# Patient Record
Sex: MALE | Race: OTHER | NOT HISPANIC OR LATINO
[De-identification: names, ages, dates, MRNs, and addresses within clinical notes are randomized per-mention and may not be internally consistent; named-entity substitution may affect disease eponyms.]

---

## 2017-10-30 PROBLEM — Z00.00 ENCOUNTER FOR PREVENTIVE HEALTH EXAMINATION: Status: ACTIVE | Noted: 2017-10-30

## 2017-11-08 ENCOUNTER — APPOINTMENT (OUTPATIENT)
Dept: SURGICAL ONCOLOGY | Facility: CLINIC | Age: 57
End: 2017-11-08
Payer: MEDICAID

## 2017-11-08 VITALS
HEART RATE: 48 BPM | HEIGHT: 71 IN | SYSTOLIC BLOOD PRESSURE: 162 MMHG | TEMPERATURE: 98.1 F | WEIGHT: 220 LBS | BODY MASS INDEX: 30.8 KG/M2 | DIASTOLIC BLOOD PRESSURE: 94 MMHG

## 2017-11-08 DIAGNOSIS — Z80.42 FAMILY HISTORY OF MALIGNANT NEOPLASM OF PROSTATE: ICD-10-CM

## 2017-11-08 DIAGNOSIS — R93.8 ABNORMAL FINDINGS ON DIAGNOSTIC IMAGING OF OTHER SPECIFIED BODY STRUCTURES: ICD-10-CM

## 2017-11-08 DIAGNOSIS — Z87.891 PERSONAL HISTORY OF NICOTINE DEPENDENCE: ICD-10-CM

## 2017-11-08 DIAGNOSIS — Z80.1 FAMILY HISTORY OF MALIGNANT NEOPLASM OF TRACHEA, BRONCHUS AND LUNG: ICD-10-CM

## 2017-11-08 PROCEDURE — 99204 OFFICE O/P NEW MOD 45 MIN: CPT

## 2017-11-12 ENCOUNTER — FORM ENCOUNTER (OUTPATIENT)
Age: 57
End: 2017-11-12

## 2017-11-13 ENCOUNTER — APPOINTMENT (OUTPATIENT)
Dept: RADIOLOGY | Facility: CLINIC | Age: 57
End: 2017-11-13
Payer: MEDICAID

## 2017-11-13 ENCOUNTER — OUTPATIENT (OUTPATIENT)
Dept: OUTPATIENT SERVICES | Facility: HOSPITAL | Age: 57
LOS: 1 days | End: 2017-11-13
Payer: MEDICAID

## 2017-11-13 ENCOUNTER — APPOINTMENT (OUTPATIENT)
Dept: ULTRASOUND IMAGING | Facility: CLINIC | Age: 57
End: 2017-11-13
Payer: MEDICAID

## 2017-11-13 DIAGNOSIS — Z00.8 ENCOUNTER FOR OTHER GENERAL EXAMINATION: ICD-10-CM

## 2017-11-13 DIAGNOSIS — S62.90XA UNSPECIFIED FRACTURE OF UNSPECIFIED WRIST AND HAND, INITIAL ENCOUNTER FOR CLOSED FRACTURE: Chronic | ICD-10-CM

## 2017-11-13 DIAGNOSIS — R22.41 LOCALIZED SWELLING, MASS AND LUMP, RIGHT LOWER LIMB: ICD-10-CM

## 2017-11-13 PROCEDURE — 73552 X-RAY EXAM OF FEMUR 2/>: CPT | Mod: 26,RT

## 2017-11-13 PROCEDURE — 76882 US LMTD JT/FCL EVL NVASC XTR: CPT

## 2017-11-13 PROCEDURE — 76882 US LMTD JT/FCL EVL NVASC XTR: CPT | Mod: 26,RT

## 2017-11-13 PROCEDURE — 73552 X-RAY EXAM OF FEMUR 2/>: CPT

## 2017-11-19 ENCOUNTER — FORM ENCOUNTER (OUTPATIENT)
Age: 57
End: 2017-11-19

## 2017-11-20 ENCOUNTER — APPOINTMENT (OUTPATIENT)
Dept: ULTRASOUND IMAGING | Facility: CLINIC | Age: 57
End: 2017-11-20
Payer: MEDICAID

## 2017-11-20 ENCOUNTER — OUTPATIENT (OUTPATIENT)
Dept: OUTPATIENT SERVICES | Facility: HOSPITAL | Age: 57
LOS: 1 days | End: 2017-11-20
Payer: MEDICAID

## 2017-11-20 ENCOUNTER — RESULT REVIEW (OUTPATIENT)
Age: 57
End: 2017-11-20

## 2017-11-20 DIAGNOSIS — R22.41 LOCALIZED SWELLING, MASS AND LUMP, RIGHT LOWER LIMB: ICD-10-CM

## 2017-11-20 DIAGNOSIS — Z00.8 ENCOUNTER FOR OTHER GENERAL EXAMINATION: ICD-10-CM

## 2017-11-20 DIAGNOSIS — S62.90XA UNSPECIFIED FRACTURE OF UNSPECIFIED WRIST AND HAND, INITIAL ENCOUNTER FOR CLOSED FRACTURE: Chronic | ICD-10-CM

## 2017-11-20 PROCEDURE — 10022: CPT

## 2017-11-20 PROCEDURE — 76942 ECHO GUIDE FOR BIOPSY: CPT | Mod: 26

## 2017-11-20 PROCEDURE — 76942 ECHO GUIDE FOR BIOPSY: CPT

## 2017-12-01 ENCOUNTER — RESULT REVIEW (OUTPATIENT)
Age: 57
End: 2017-12-01

## 2017-12-15 ENCOUNTER — FORM ENCOUNTER (OUTPATIENT)
Age: 57
End: 2017-12-15

## 2017-12-16 ENCOUNTER — OUTPATIENT (OUTPATIENT)
Dept: OUTPATIENT SERVICES | Facility: HOSPITAL | Age: 57
LOS: 1 days | End: 2017-12-16
Payer: MEDICAID

## 2017-12-16 ENCOUNTER — APPOINTMENT (OUTPATIENT)
Dept: MRI IMAGING | Facility: CLINIC | Age: 57
End: 2017-12-16
Payer: MEDICAID

## 2017-12-16 DIAGNOSIS — Z00.8 ENCOUNTER FOR OTHER GENERAL EXAMINATION: ICD-10-CM

## 2017-12-16 DIAGNOSIS — R22.41 LOCALIZED SWELLING, MASS AND LUMP, RIGHT LOWER LIMB: ICD-10-CM

## 2017-12-16 DIAGNOSIS — S62.90XA UNSPECIFIED FRACTURE OF UNSPECIFIED WRIST AND HAND, INITIAL ENCOUNTER FOR CLOSED FRACTURE: Chronic | ICD-10-CM

## 2017-12-16 PROCEDURE — 72197 MRI PELVIS W/O & W/DYE: CPT

## 2017-12-16 PROCEDURE — 72197 MRI PELVIS W/O & W/DYE: CPT | Mod: 26

## 2018-01-10 ENCOUNTER — OUTPATIENT (OUTPATIENT)
Dept: OUTPATIENT SERVICES | Facility: HOSPITAL | Age: 58
LOS: 1 days | End: 2018-01-10
Payer: MEDICAID

## 2018-01-10 VITALS
HEART RATE: 42 BPM | RESPIRATION RATE: 16 BRPM | DIASTOLIC BLOOD PRESSURE: 80 MMHG | SYSTOLIC BLOOD PRESSURE: 142 MMHG | WEIGHT: 223.11 LBS | TEMPERATURE: 97 F | HEIGHT: 69 IN

## 2018-01-10 DIAGNOSIS — D23.70 OTHER BENIGN NEOPLASM OF SKIN OF UNSPECIFIED LOWER LIMB, INCLUDING HIP: ICD-10-CM

## 2018-01-10 DIAGNOSIS — R00.1 BRADYCARDIA, UNSPECIFIED: ICD-10-CM

## 2018-01-10 DIAGNOSIS — S62.90XA UNSPECIFIED FRACTURE OF UNSPECIFIED WRIST AND HAND, INITIAL ENCOUNTER FOR CLOSED FRACTURE: Chronic | ICD-10-CM

## 2018-01-10 DIAGNOSIS — Z98.890 OTHER SPECIFIED POSTPROCEDURAL STATES: Chronic | ICD-10-CM

## 2018-01-10 LAB
BLD GP AB SCN SERPL QL: NEGATIVE — SIGNIFICANT CHANGE UP
BUN SERPL-MCNC: 17 MG/DL — SIGNIFICANT CHANGE UP (ref 7–23)
CALCIUM SERPL-MCNC: 9.8 MG/DL — SIGNIFICANT CHANGE UP (ref 8.4–10.5)
CHLORIDE SERPL-SCNC: 102 MMOL/L — SIGNIFICANT CHANGE UP (ref 98–107)
CO2 SERPL-SCNC: 27 MMOL/L — SIGNIFICANT CHANGE UP (ref 22–31)
CREAT SERPL-MCNC: 1.12 MG/DL — SIGNIFICANT CHANGE UP (ref 0.5–1.3)
GLUCOSE SERPL-MCNC: 78 MG/DL — SIGNIFICANT CHANGE UP (ref 70–99)
HCT VFR BLD CALC: 43.8 % — SIGNIFICANT CHANGE UP (ref 39–50)
HGB BLD-MCNC: 14.9 G/DL — SIGNIFICANT CHANGE UP (ref 13–17)
MCHC RBC-ENTMCNC: 29.2 PG — SIGNIFICANT CHANGE UP (ref 27–34)
MCHC RBC-ENTMCNC: 34 % — SIGNIFICANT CHANGE UP (ref 32–36)
MCV RBC AUTO: 85.9 FL — SIGNIFICANT CHANGE UP (ref 80–100)
NRBC # FLD: 0 — SIGNIFICANT CHANGE UP
PLATELET # BLD AUTO: 177 K/UL — SIGNIFICANT CHANGE UP (ref 150–400)
PMV BLD: 10.1 FL — SIGNIFICANT CHANGE UP (ref 7–13)
POTASSIUM SERPL-MCNC: 3.5 MMOL/L — SIGNIFICANT CHANGE UP (ref 3.5–5.3)
POTASSIUM SERPL-SCNC: 3.5 MMOL/L — SIGNIFICANT CHANGE UP (ref 3.5–5.3)
RBC # BLD: 5.1 M/UL — SIGNIFICANT CHANGE UP (ref 4.2–5.8)
RBC # FLD: 12.5 % — SIGNIFICANT CHANGE UP (ref 10.3–14.5)
RH IG SCN BLD-IMP: POSITIVE — SIGNIFICANT CHANGE UP
SODIUM SERPL-SCNC: 141 MMOL/L — SIGNIFICANT CHANGE UP (ref 135–145)
WBC # BLD: 5.3 K/UL — SIGNIFICANT CHANGE UP (ref 3.8–10.5)
WBC # FLD AUTO: 5.3 K/UL — SIGNIFICANT CHANGE UP (ref 3.8–10.5)

## 2018-01-10 PROCEDURE — 93010 ELECTROCARDIOGRAM REPORT: CPT

## 2018-01-10 NOTE — H&P PST ADULT - HISTORY OF PRESENT ILLNESS
56 y/o  male with no significant PMH   referred to dermatologist who then referred him to surgeon. 56 y/o  male with no significant PMH presents to PST for pre op evaluation with hx of right hip mass x 2 years. Pt stated that he was referred to dermatologist who then referred him to surgeon. Now scheduled for Wide excision right lateral hip neoplasm on 01/16/18

## 2018-01-10 NOTE — H&P PST ADULT - NEGATIVE BREAST SYMPTOMS
no breast tenderness R/no nipple discharge R/no breast lump L/no breast lump R/no nipple discharge L/no breast tenderness L

## 2018-01-10 NOTE — H&P PST ADULT - PMH
BPH (benign prostatic hyperplasia)    Skin cancer    Testicular abnormality  has only one testicle s/p injury 25 yrs ago BPH (benign prostatic hyperplasia)    Obesity (BMI 30.0-34.9)    Other benign neoplasm of skin of unspecified lower limb, including hip    Skin cancer    Testicular abnormality  has only one testicle s/p injury 25 yrs ago

## 2018-01-10 NOTE — H&P PST ADULT - ATTENDING COMMENTS
57-year-old man with a soft tissue mass of the outer right hip scheduled for resection with reconstruction by Dr. Mcgregor.    Preoperative biopsy demonstrates a neoplastic process, with spindle cells, not obviously malignant.    In 2002 he had resection of a soft tissue mass from the same region.  He does not recall the location of operation, nor does he recalled the diagnosis.  He knows that he did not require any postoperative treatment.    Imaging demonstrates a process that does not invade the muscle.    Option for surgical resection for proper diagnosis and treatment with him and his significant (Ms Mackenzie) both in the office, and again this morning.    All questions answered.  Consent on chart 57-year-old man with a soft tissue mass of the outer right hip scheduled for resection with reconstruction by Dr. Mcgregor.    Preoperative biopsy demonstrates a neoplastic process, with spindle cells, not obviously malignant.    In 2002 he had resection of a soft tissue mass from the same region.  He does not recall the location of operation, nor does he recalled the diagnosis.  He knows that he did not require any postoperative treatment.    Imaging demonstrates a process that does not invade the muscle.    Option for surgical resection for proper diagnosis and treatment with him and his significant (Ms Mackenzie) both in the office, and again this morning.    All questions answered.  Consent on chart    Addendum.  He has a dysplastic nevus on the dorsal aspect of his right foot.  At his index visit to me indication was that his foot excision will be done by his dermatologist.  He and his significant other requested by Dr. Mcgregor and I addressed the same time as the hip lesion, so it was excised with 5 mm margins

## 2018-01-10 NOTE — H&P PST ADULT - NEGATIVE GENERAL GENITOURINARY SYMPTOMS
no gas in urine/no renal colic/no bladder infections/no dysuria/no flank pain R/no urine discoloration/no hematuria/no flank pain L/normal libido

## 2018-01-10 NOTE — H&P PST ADULT - MUSCULOSKELETAL
detailed exam no joint swelling/no joint erythema/no joint warmth details… no joint erythema/no calf tenderness/normal strength/no joint warmth/ROM intact/no joint swelling

## 2018-01-10 NOTE — H&P PST ADULT - NSANTHOSAYNRD_GEN_A_CORE
No. TIGRE screening performed.  STOP BANG Legend: 0-2 = LOW Risk; 3-4 = INTERMEDIATE Risk; 5-8 = HIGH Risk

## 2018-01-10 NOTE — H&P PST ADULT - NEGATIVE CARDIOVASCULAR SYMPTOMS
no palpitations/no orthopnea/no paroxysmal nocturnal dyspnea/no dyspnea on exertion/no chest pain/no claudication/no peripheral edema

## 2018-01-10 NOTE — H&P PST ADULT - NEGATIVE OPHTHALMOLOGIC SYMPTOMS
no photophobia/no diplopia/no lacrimation L/no discharge L/no discharge R/no pain L/no irritation R/no loss of vision R/no pain R/no loss of vision L/no irritation L/no lacrimation R/no blurred vision L/no blurred vision R

## 2018-01-10 NOTE — H&P PST ADULT - PROBLEM SELECTOR PLAN 2
Bradycardia on EKG, pt stated that he was sent to cardiologist by PCP for slow heart rate. Pt denies any palpitations, SOB, CP or any cardiac symptoms.  Last cardiology note requested

## 2018-01-11 ENCOUNTER — APPOINTMENT (OUTPATIENT)
Dept: PLASTIC SURGERY | Facility: CLINIC | Age: 58
End: 2018-01-11
Payer: MEDICAID

## 2018-01-11 VITALS
BODY MASS INDEX: 31.5 KG/M2 | HEIGHT: 71 IN | DIASTOLIC BLOOD PRESSURE: 79 MMHG | HEART RATE: 45 BPM | SYSTOLIC BLOOD PRESSURE: 139 MMHG | OXYGEN SATURATION: 96 % | WEIGHT: 225 LBS

## 2018-01-11 PROCEDURE — 99204 OFFICE O/P NEW MOD 45 MIN: CPT

## 2018-01-12 NOTE — ASU PATIENT PROFILE, ADULT - PMH
BPH (benign prostatic hyperplasia)    Obesity (BMI 30.0-34.9)    Other benign neoplasm of skin of unspecified lower limb, including hip    Skin cancer    Testicular abnormality  has only one testicle s/p injury 25 yrs ago

## 2018-01-16 ENCOUNTER — APPOINTMENT (OUTPATIENT)
Dept: SURGICAL ONCOLOGY | Facility: HOSPITAL | Age: 58
End: 2018-01-16

## 2018-01-16 ENCOUNTER — RESULT REVIEW (OUTPATIENT)
Age: 58
End: 2018-01-16

## 2018-01-16 ENCOUNTER — TRANSCRIPTION ENCOUNTER (OUTPATIENT)
Age: 58
End: 2018-01-16

## 2018-01-16 ENCOUNTER — APPOINTMENT (OUTPATIENT)
Dept: PLASTIC SURGERY | Facility: HOSPITAL | Age: 58
End: 2018-01-16

## 2018-01-16 ENCOUNTER — INPATIENT (INPATIENT)
Facility: HOSPITAL | Age: 58
LOS: 0 days | Discharge: HOME | End: 2018-01-16
Attending: SPECIALIST | Admitting: SPECIALIST
Payer: MEDICAID

## 2018-01-16 VITALS
HEART RATE: 67 BPM | RESPIRATION RATE: 19 BRPM | SYSTOLIC BLOOD PRESSURE: 129 MMHG | DIASTOLIC BLOOD PRESSURE: 75 MMHG | OXYGEN SATURATION: 96 %

## 2018-01-16 VITALS
HEART RATE: 43 BPM | TEMPERATURE: 98 F | SYSTOLIC BLOOD PRESSURE: 155 MMHG | DIASTOLIC BLOOD PRESSURE: 80 MMHG | OXYGEN SATURATION: 100 % | RESPIRATION RATE: 16 BRPM | HEIGHT: 69 IN | WEIGHT: 223.11 LBS

## 2018-01-16 DIAGNOSIS — S62.90XA UNSPECIFIED FRACTURE OF UNSPECIFIED WRIST AND HAND, INITIAL ENCOUNTER FOR CLOSED FRACTURE: Chronic | ICD-10-CM

## 2018-01-16 DIAGNOSIS — Z98.890 OTHER SPECIFIED POSTPROCEDURAL STATES: Chronic | ICD-10-CM

## 2018-01-16 DIAGNOSIS — D23.70 OTHER BENIGN NEOPLASM OF SKIN OF UNSPECIFIED LOWER LIMB, INCLUDING HIP: ICD-10-CM

## 2018-01-16 LAB — RH IG SCN BLD-IMP: POSITIVE — SIGNIFICANT CHANGE UP

## 2018-01-16 PROCEDURE — 88305 TISSUE EXAM BY PATHOLOGIST: CPT | Mod: 26

## 2018-01-16 PROCEDURE — 88342 IMHCHEM/IMCYTCHM 1ST ANTB: CPT | Mod: 26

## 2018-01-16 PROCEDURE — 88341 IMHCHEM/IMCYTCHM EA ADD ANTB: CPT | Mod: 26

## 2018-01-16 RX ORDER — SODIUM CHLORIDE 9 MG/ML
1000 INJECTION, SOLUTION INTRAVENOUS
Qty: 0 | Refills: 0 | Status: DISCONTINUED | OUTPATIENT
Start: 2018-01-16 | End: 2018-01-16

## 2018-01-16 RX ORDER — FENTANYL CITRATE 50 UG/ML
25 INJECTION INTRAVENOUS
Qty: 0 | Refills: 0 | Status: DISCONTINUED | OUTPATIENT
Start: 2018-01-16 | End: 2018-01-16

## 2018-01-16 RX ORDER — OXYCODONE HYDROCHLORIDE 5 MG/1
5 TABLET ORAL EVERY 4 HOURS
Qty: 0 | Refills: 0 | Status: DISCONTINUED | OUTPATIENT
Start: 2018-01-16 | End: 2018-01-16

## 2018-01-16 RX ORDER — OXYCODONE HYDROCHLORIDE 5 MG/1
10 TABLET ORAL EVERY 6 HOURS
Qty: 0 | Refills: 0 | Status: DISCONTINUED | OUTPATIENT
Start: 2018-01-16 | End: 2018-01-16

## 2018-01-16 RX ORDER — OXYCODONE AND ACETAMINOPHEN 5; 325 MG/1; MG/1
1 TABLET ORAL
Qty: 12 | Refills: 0
Start: 2018-01-16 | End: 2018-01-18

## 2018-01-16 RX ORDER — HYDROMORPHONE HYDROCHLORIDE 2 MG/ML
0.5 INJECTION INTRAMUSCULAR; INTRAVENOUS; SUBCUTANEOUS
Qty: 0 | Refills: 0 | Status: DISCONTINUED | OUTPATIENT
Start: 2018-01-16 | End: 2018-01-16

## 2018-01-16 RX ORDER — ONDANSETRON 8 MG/1
4 TABLET, FILM COATED ORAL ONCE
Qty: 0 | Refills: 0 | Status: DISCONTINUED | OUTPATIENT
Start: 2018-01-16 | End: 2018-01-16

## 2018-01-16 RX ORDER — SODIUM CHLORIDE 9 MG/ML
3 INJECTION INTRAMUSCULAR; INTRAVENOUS; SUBCUTANEOUS EVERY 8 HOURS
Qty: 0 | Refills: 0 | Status: DISCONTINUED | OUTPATIENT
Start: 2018-01-16 | End: 2018-01-16

## 2018-01-16 RX ADMIN — HYDROMORPHONE HYDROCHLORIDE 0.5 MILLIGRAM(S): 2 INJECTION INTRAMUSCULAR; INTRAVENOUS; SUBCUTANEOUS at 15:32

## 2018-01-16 RX ADMIN — SODIUM CHLORIDE 150 MILLILITER(S): 9 INJECTION, SOLUTION INTRAVENOUS at 15:13

## 2018-01-16 RX ADMIN — HYDROMORPHONE HYDROCHLORIDE 0.5 MILLIGRAM(S): 2 INJECTION INTRAMUSCULAR; INTRAVENOUS; SUBCUTANEOUS at 15:10

## 2018-01-16 RX ADMIN — HYDROMORPHONE HYDROCHLORIDE 0.5 MILLIGRAM(S): 2 INJECTION INTRAMUSCULAR; INTRAVENOUS; SUBCUTANEOUS at 15:23

## 2018-01-16 RX ADMIN — FENTANYL CITRATE 25 MICROGRAM(S): 50 INJECTION INTRAVENOUS at 15:12

## 2018-01-16 RX ADMIN — HYDROMORPHONE HYDROCHLORIDE 0.5 MILLIGRAM(S): 2 INJECTION INTRAMUSCULAR; INTRAVENOUS; SUBCUTANEOUS at 15:12

## 2018-01-16 RX ADMIN — FENTANYL CITRATE 25 MICROGRAM(S): 50 INJECTION INTRAVENOUS at 14:49

## 2018-01-16 NOTE — BRIEF OPERATIVE NOTE - PRE-OP DX
Dysplastic nevus of right lower extremity  01/16/2018  Dorsal foot  Active  Interfaces, RTIF  Hematoma of right hip, initial encounter  01/16/2018    Active  Collins Keys  Hip region mass, right  01/16/2018    Active  Interfaces, RTIF

## 2018-01-16 NOTE — ASU DISCHARGE PLAN (ADULT/PEDIATRIC). - MEDICATION SUMMARY - MEDICATIONS TO TAKE
I will START or STAY ON the medications listed below when I get home from the hospital:  None I will START or STAY ON the medications listed below when I get home from the hospital:    oxyCODONE-acetaminophen 5 mg-325 mg oral tablet  -- 1 tab(s) by mouth every 6 hours, As Needed -for severe pain MDD:4 tablets.   -- Caution federal law prohibits the transfer of this drug to any person other  than the person for whom it was prescribed.  May cause drowsiness.  Alcohol may intensify this effect.  Use care when operating dangerous machinery.  This prescription cannot be refilled.  This product contains acetaminophen.  Do not use  with any other product containing acetaminophen to prevent possible liver damage.  Using more of this medication than prescribed may cause serious breathing problems.    -- Indication: For Other benign neoplasm of skin of lower extremity, including hip I will START or STAY ON the medications listed below when I get home from the hospital:    oxyCODONE-acetaminophen 5 mg-325 mg oral tablet  -- 1 tab(s) by mouth every 6 hours, As Needed -for severe pain MDD:4 tablets  -- Caution federal law prohibits the transfer of this drug to any person other  than the person for whom it was prescribed.  May cause drowsiness.  Alcohol may intensify this effect.  Use care when operating dangerous machinery.  This prescription cannot be refilled.  This product contains acetaminophen.  Do not use  with any other product containing acetaminophen to prevent possible liver damage.  Using more of this medication than prescribed may cause serious breathing problems.    -- Indication: For mod-severe pain

## 2018-01-16 NOTE — ASU PREOP CHECKLIST - 2.
Right hip surgery site  hematoma and bleeding . Pt is going to back to surgery by Dr montgomery .at 6pm

## 2018-01-16 NOTE — BRIEF OPERATIVE NOTE - POST-OP DX
Hip region mass, right  01/16/2018    Active  Beg, [place] SHEPHERD Dysplastic nevus of right lower extremity  01/16/2018  Dorsal foot  Active  Day[place] Dumont  Hip region mass, right  01/16/2018    Active  [place] Dumont

## 2018-01-16 NOTE — BRIEF OPERATIVE NOTE - OPERATION/FINDINGS
300 mL of hematoma evacuated. Bleeding vessel found in muscle suture-ligated. Wound irrigated and hemostasis achieved. Wound closed over 15 Ethiop[place] Leo drain.

## 2018-01-16 NOTE — ASU DISCHARGE PLAN (ADULT/PEDIATRIC). - ITEMS TO FOLLOWUP WITH YOUR PHYSICIAN'S
See Dr Mcgregor in a week or two    Dr. Dumont should call with pathology report by January 25 See Dr Mcgregor in 2 weeks.    Dr. Dumont should call with pathology report by January 25 See Dr Mcgregor in 1 week.    Dr. Dumont should call with pathology report by January 25

## 2018-01-16 NOTE — ASU DISCHARGE PLAN (ADULT/PEDIATRIC). - YOU WERE IN THE HOSPITAL FOR:
Removal of right hip mass with reconstruction by Dr. Mcgregor Removal of right hip mass and right foot lesion with reconstruction Removal of right hip mass and right foot lesion with reconstruction, return to OR for evacuation of right hip hematoma

## 2018-01-16 NOTE — BRIEF OPERATIVE NOTE - PROCEDURE
<<-----Click on this checkbox to enter Procedure Excision  01/16/2018  outer right hip mass with reconstruction (Dr Mcgregor)  Active  MBEG

## 2018-01-16 NOTE — CHART NOTE - NSCHARTNOTEFT_GEN_A_CORE
At approximately 17:00 the patient was found to have developed a hematoma at the right thigh incision site. He denied pain but did complain of some tightness in his right thigh. The patient was alert and hemodynamically stable. On exam a firm fluid collection was palpated at the incision site. The dressing was saturated with sanguinous drainage. The JAI drain had put out 80 mL of sanguinous fluid. The patient was examined by Dr. Dumont as well. The findings were discussed with Dr. Mcgregor, and the decision was made to return to the OR emergently for evacuation of the right thigh hematoma, washout, and closure. The plan was discussed with the patient and family who were in agreement.    p [id number]

## 2018-01-16 NOTE — BRIEF OPERATIVE NOTE - PROCEDURE
<<-----Click on this checkbox to enter Procedure Evacuation of hematoma from right hip  01/16/2018    Active  [id number]

## 2018-01-16 NOTE — ASU DISCHARGE PLAN (ADULT/PEDIATRIC). - FOLLOWUP APPOINTMENT CLINIC/PHYSICIAN
Please follow up with Dr. Mcgregor within x1 week after discharge from the hospital. You may call [phone] to schedule an appointment.

## 2018-01-16 NOTE — ASU DISCHARGE PLAN (ADULT/PEDIATRIC). - NOTIFY
Excessive Diarrhea/Fever greater than 101/Inability to Tolerate Liquids or Foods/Numbness, tingling/Increased Irritability or Sluggishness/Unable to Urinate/Numbness, color, or temperature change to extremity/Bleeding that does not stop/Swelling that continues/Pain not relieved by Medications/Persistent Nausea and Vomiting

## 2018-01-16 NOTE — ASU DISCHARGE PLAN (ADULT/PEDIATRIC). - NURSING INSTRUCTIONS
Stable for discharge  JAI drain teaching and written instructions given. Patient with good understanding

## 2018-01-16 NOTE — ASU DISCHARGE PLAN (ADULT/PEDIATRIC). - ASU FOLLOWUP
call  PAC[facility]  [phone] ( open  24 hour  and weekend ) , [phone] ( open 6AM to 11 PM  closed weekend)/[facility] (Adult):

## 2018-01-17 ENCOUNTER — TRANSCRIPTION ENCOUNTER (OUTPATIENT)
Age: 58
End: 2018-01-17

## 2018-01-17 NOTE — PROGRESS NOTE ADULT - ATTENDING COMMENTS
Seen in PACU    POD#1: resection of right hip tumor, with RTOR for post-op bleed    No events overnight    VSS, afebrile  JAI:160 cc overnight  Tolerating po  Voiding    am labs pending    Plan:  If clinically stable, home later today

## 2018-01-18 ENCOUNTER — TRANSCRIPTION ENCOUNTER (OUTPATIENT)
Age: 58
End: 2018-01-18

## 2018-01-26 ENCOUNTER — APPOINTMENT (OUTPATIENT)
Dept: PLASTIC SURGERY | Facility: CLINIC | Age: 58
End: 2018-01-26
Payer: MEDICAID

## 2018-01-26 DIAGNOSIS — R22.41 LOCALIZED SWELLING, MASS AND LUMP, RIGHT LOWER LIMB: ICD-10-CM

## 2018-01-26 LAB — SURGICAL PATHOLOGY STUDY: SIGNIFICANT CHANGE UP

## 2018-01-26 PROCEDURE — 99024 POSTOP FOLLOW-UP VISIT: CPT

## 2018-01-31 ENCOUNTER — APPOINTMENT (OUTPATIENT)
Dept: PLASTIC SURGERY | Facility: CLINIC | Age: 58
End: 2018-01-31
Payer: MEDICAID

## 2018-01-31 VITALS
RESPIRATION RATE: 16 BRPM | WEIGHT: 225 LBS | BODY MASS INDEX: 31.5 KG/M2 | OXYGEN SATURATION: 98 % | DIASTOLIC BLOOD PRESSURE: 85 MMHG | HEART RATE: 50 BPM | HEIGHT: 71 IN | SYSTOLIC BLOOD PRESSURE: 143 MMHG

## 2018-01-31 DIAGNOSIS — D23.9 OTHER BENIGN NEOPLASM OF SKIN, UNSPECIFIED: ICD-10-CM

## 2018-01-31 PROCEDURE — 99024 POSTOP FOLLOW-UP VISIT: CPT

## 2018-02-01 PROBLEM — D23.9 DYSPLASTIC NEVUS: Status: ACTIVE | Noted: 2017-11-08

## 2018-02-07 ENCOUNTER — APPOINTMENT (OUTPATIENT)
Dept: PLASTIC SURGERY | Facility: CLINIC | Age: 58
End: 2018-02-07
Payer: MEDICAID

## 2018-02-07 VITALS
HEART RATE: 52 BPM | HEIGHT: 71 IN | SYSTOLIC BLOOD PRESSURE: 123 MMHG | DIASTOLIC BLOOD PRESSURE: 66 MMHG | BODY MASS INDEX: 31.5 KG/M2 | WEIGHT: 225 LBS

## 2018-02-07 PROCEDURE — 99024 POSTOP FOLLOW-UP VISIT: CPT

## 2018-02-09 ENCOUNTER — FORM ENCOUNTER (OUTPATIENT)
Age: 58
End: 2018-02-09

## 2018-02-09 ENCOUNTER — OUTPATIENT (OUTPATIENT)
Dept: OUTPATIENT SERVICES | Facility: HOSPITAL | Age: 58
LOS: 1 days | Discharge: HOME | End: 2018-02-09

## 2018-02-09 DIAGNOSIS — Z98.890 OTHER SPECIFIED POSTPROCEDURAL STATES: Chronic | ICD-10-CM

## 2018-02-09 DIAGNOSIS — S62.90XA UNSPECIFIED FRACTURE OF UNSPECIFIED WRIST AND HAND, INITIAL ENCOUNTER FOR CLOSED FRACTURE: Chronic | ICD-10-CM

## 2018-02-10 ENCOUNTER — OUTPATIENT (OUTPATIENT)
Dept: OUTPATIENT SERVICES | Facility: HOSPITAL | Age: 58
LOS: 1 days | End: 2018-02-10
Payer: MEDICAID

## 2018-02-10 ENCOUNTER — APPOINTMENT (OUTPATIENT)
Dept: CT IMAGING | Facility: CLINIC | Age: 58
End: 2018-02-10
Payer: MEDICAID

## 2018-02-10 DIAGNOSIS — S62.90XA UNSPECIFIED FRACTURE OF UNSPECIFIED WRIST AND HAND, INITIAL ENCOUNTER FOR CLOSED FRACTURE: Chronic | ICD-10-CM

## 2018-02-10 DIAGNOSIS — Z98.890 OTHER SPECIFIED POSTPROCEDURAL STATES: Chronic | ICD-10-CM

## 2018-02-10 DIAGNOSIS — C49.21 MALIGNANT NEOPLASM OF CONNECTIVE AND SOFT TISSUE OF RIGHT LOWER LIMB, INCLUDING HIP: ICD-10-CM

## 2018-02-10 PROCEDURE — 71250 CT THORAX DX C-: CPT | Mod: 26

## 2018-02-10 PROCEDURE — 71250 CT THORAX DX C-: CPT

## 2018-02-12 ENCOUNTER — OUTPATIENT (OUTPATIENT)
Dept: OUTPATIENT SERVICES | Facility: HOSPITAL | Age: 58
LOS: 1 days | Discharge: HOME | End: 2018-02-12
Payer: MEDICAID

## 2018-02-12 DIAGNOSIS — S62.90XA UNSPECIFIED FRACTURE OF UNSPECIFIED WRIST AND HAND, INITIAL ENCOUNTER FOR CLOSED FRACTURE: Chronic | ICD-10-CM

## 2018-02-12 DIAGNOSIS — Z98.890 OTHER SPECIFIED POSTPROCEDURAL STATES: Chronic | ICD-10-CM

## 2018-02-12 PROBLEM — R93.8 ABNORMAL RADIOGRAPH: Status: ACTIVE | Noted: 2018-02-12

## 2018-02-14 ENCOUNTER — APPOINTMENT (OUTPATIENT)
Dept: RADIATION ONCOLOGY | Facility: CLINIC | Age: 58
End: 2018-02-14
Payer: MEDICAID

## 2018-02-14 VITALS
TEMPERATURE: 97.9 F | HEIGHT: 69 IN | RESPIRATION RATE: 16 BRPM | HEART RATE: 51 BPM | WEIGHT: 230 LBS | BODY MASS INDEX: 34.07 KG/M2 | SYSTOLIC BLOOD PRESSURE: 183 MMHG | DIASTOLIC BLOOD PRESSURE: 94 MMHG | OXYGEN SATURATION: 100 %

## 2018-02-14 DIAGNOSIS — D48.1 NEOPLASM OF UNCERTAIN BEHAVIOR OF CONNECTIVE AND OTHER SOFT TISSUE: ICD-10-CM

## 2018-02-14 PROCEDURE — 99204 OFFICE O/P NEW MOD 45 MIN: CPT | Mod: 25

## 2018-02-14 PROCEDURE — 77263 THER RADIOLOGY TX PLNG CPLX: CPT

## 2018-02-14 RX ORDER — AMOXICILLIN 500 MG/1
500 CAPSULE ORAL
Qty: 30 | Refills: 0 | Status: COMPLETED | COMMUNITY
Start: 2017-06-12 | End: 2018-02-14

## 2018-02-14 RX ORDER — OMEPRAZOLE 40 MG/1
40 CAPSULE, DELAYED RELEASE ORAL
Qty: 30 | Refills: 0 | Status: COMPLETED | COMMUNITY
Start: 2017-10-30 | End: 2018-02-14

## 2018-02-14 RX ORDER — CLOTRIMAZOLE AND BETAMETHASONE DIPROPIONATE 10; .5 MG/ML; MG/ML
1-0.05 LOTION TOPICAL
Qty: 30 | Refills: 0 | Status: COMPLETED | COMMUNITY
Start: 2017-10-02 | End: 2018-02-14

## 2018-02-14 RX ORDER — OMEPRAZOLE 20 MG/1
20 CAPSULE, DELAYED RELEASE ORAL
Qty: 30 | Refills: 0 | Status: COMPLETED | COMMUNITY
Start: 2017-06-28 | End: 2018-02-14

## 2018-02-14 RX ORDER — OXYCODONE AND ACETAMINOPHEN 5; 325 MG/1; MG/1
5-325 TABLET ORAL
Qty: 12 | Refills: 0 | Status: COMPLETED | COMMUNITY
Start: 2018-01-16 | End: 2018-02-14

## 2018-02-14 RX ORDER — VALACYCLOVIR 500 MG/1
500 TABLET, FILM COATED ORAL
Qty: 6 | Refills: 0 | Status: COMPLETED | COMMUNITY
Start: 2017-06-21 | End: 2018-02-14

## 2018-02-14 RX ORDER — IBUPROFEN 600 MG/1
600 TABLET, FILM COATED ORAL
Qty: 90 | Refills: 0 | Status: COMPLETED | COMMUNITY
Start: 2017-10-16 | End: 2018-02-14

## 2018-02-14 RX ORDER — HYDROXYZINE HYDROCHLORIDE 25 MG/1
25 TABLET ORAL
Qty: 60 | Refills: 0 | Status: COMPLETED | COMMUNITY
Start: 2017-10-16 | End: 2018-02-14

## 2018-02-14 RX ORDER — MULTIVIT-MIN/FOLIC/VIT K/LYCOP 400-300MCG
1000 TABLET ORAL
Qty: 30 | Refills: 0 | Status: COMPLETED | COMMUNITY
Start: 2017-06-23 | End: 2018-02-14

## 2018-02-14 RX ORDER — ERGOCALCIFEROL 1.25 MG/1
1.25 MG CAPSULE, LIQUID FILLED ORAL
Qty: 4 | Refills: 0 | Status: COMPLETED | COMMUNITY
Start: 2017-05-12 | End: 2018-02-14

## 2018-02-14 RX ORDER — DICLOFENAC SODIUM 10 MG/G
1 GEL TOPICAL
Qty: 120 | Refills: 0 | Status: COMPLETED | COMMUNITY
Start: 2017-05-11 | End: 2018-02-14

## 2018-02-14 RX ORDER — SULFAMETHOXAZOLE AND TRIMETHOPRIM 800; 160 MG/1; MG/1
800-160 TABLET ORAL
Qty: 14 | Refills: 0 | Status: COMPLETED | COMMUNITY
Start: 2018-01-29 | End: 2018-02-14

## 2018-02-14 RX ORDER — VALACYCLOVIR 1 G/1
1 TABLET, FILM COATED ORAL
Qty: 20 | Refills: 0 | Status: COMPLETED | COMMUNITY
Start: 2017-06-23 | End: 2018-02-14

## 2018-02-14 RX ORDER — FINASTERIDE 5 MG/1
5 TABLET, FILM COATED ORAL
Qty: 30 | Refills: 0 | Status: COMPLETED | COMMUNITY
Start: 2017-11-29 | End: 2018-02-14

## 2018-03-01 PROCEDURE — 77300 RADIATION THERAPY DOSE PLAN: CPT | Mod: 26

## 2018-03-01 PROCEDURE — 77295 3-D RADIOTHERAPY PLAN: CPT | Mod: 26

## 2018-03-01 PROCEDURE — 77338 DESIGN MLC DEVICE FOR IMRT: CPT | Mod: 26

## 2018-03-01 PROCEDURE — 77334 RADIATION TREATMENT AID(S): CPT | Mod: 26,59

## 2018-03-05 PROCEDURE — 77280 THER RAD SIMULAJ FIELD SMPL: CPT | Mod: 26

## 2018-03-12 VITALS
DIASTOLIC BLOOD PRESSURE: 111 MMHG | BODY MASS INDEX: 33.62 KG/M2 | RESPIRATION RATE: 16 BRPM | SYSTOLIC BLOOD PRESSURE: 160 MMHG | WEIGHT: 227 LBS | HEART RATE: 45 BPM | HEIGHT: 69 IN | OXYGEN SATURATION: 100 %

## 2018-03-15 PROCEDURE — 77427 RADIATION TX MANAGEMENT X5: CPT

## 2018-03-19 VITALS
OXYGEN SATURATION: 97 % | TEMPERATURE: 98 F | SYSTOLIC BLOOD PRESSURE: 124 MMHG | RESPIRATION RATE: 16 BRPM | DIASTOLIC BLOOD PRESSURE: 76 MMHG | HEART RATE: 59 BPM | WEIGHT: 228 LBS | HEIGHT: 69 IN | BODY MASS INDEX: 33.77 KG/M2

## 2018-03-22 PROCEDURE — 77427 RADIATION TX MANAGEMENT X5: CPT

## 2018-03-28 VITALS
OXYGEN SATURATION: 100 % | DIASTOLIC BLOOD PRESSURE: 87 MMHG | HEART RATE: 55 BPM | SYSTOLIC BLOOD PRESSURE: 173 MMHG | WEIGHT: 228 LBS | BODY MASS INDEX: 33.77 KG/M2 | TEMPERATURE: 97.8 F | HEIGHT: 69 IN | RESPIRATION RATE: 16 BRPM

## 2018-03-30 PROCEDURE — 77427 RADIATION TX MANAGEMENT X5: CPT

## 2018-04-02 VITALS
OXYGEN SATURATION: 100 % | WEIGHT: 227 LBS | BODY MASS INDEX: 33.62 KG/M2 | SYSTOLIC BLOOD PRESSURE: 165 MMHG | HEIGHT: 69 IN | HEART RATE: 44 BPM | RESPIRATION RATE: 16 BRPM | DIASTOLIC BLOOD PRESSURE: 84 MMHG | TEMPERATURE: 97.6 F

## 2018-04-06 PROCEDURE — 77427 RADIATION TX MANAGEMENT X5: CPT

## 2018-04-10 VITALS
BODY MASS INDEX: 32.88 KG/M2 | WEIGHT: 222 LBS | TEMPERATURE: 97.8 F | RESPIRATION RATE: 16 BRPM | HEART RATE: 53 BPM | DIASTOLIC BLOOD PRESSURE: 74 MMHG | HEIGHT: 69 IN | SYSTOLIC BLOOD PRESSURE: 128 MMHG | OXYGEN SATURATION: 97 %

## 2018-04-16 VITALS
SYSTOLIC BLOOD PRESSURE: 176 MMHG | HEIGHT: 69 IN | HEART RATE: 52 BPM | DIASTOLIC BLOOD PRESSURE: 78 MMHG | WEIGHT: 227 LBS | OXYGEN SATURATION: 97 % | RESPIRATION RATE: 16 BRPM | BODY MASS INDEX: 33.62 KG/M2 | TEMPERATURE: 97.8 F

## 2018-04-16 PROCEDURE — 77427 RADIATION TX MANAGEMENT X5: CPT

## 2018-05-13 ENCOUNTER — FORM ENCOUNTER (OUTPATIENT)
Age: 58
End: 2018-05-13

## 2018-05-14 ENCOUNTER — APPOINTMENT (OUTPATIENT)
Dept: CT IMAGING | Facility: CLINIC | Age: 58
End: 2018-05-14
Payer: MEDICAID

## 2018-05-14 ENCOUNTER — OUTPATIENT (OUTPATIENT)
Dept: OUTPATIENT SERVICES | Facility: HOSPITAL | Age: 58
LOS: 1 days | End: 2018-05-14
Payer: MEDICAID

## 2018-05-14 DIAGNOSIS — R93.8 ABNORMAL FINDINGS ON DIAGNOSTIC IMAGING OF OTHER SPECIFIED BODY STRUCTURES: ICD-10-CM

## 2018-05-14 DIAGNOSIS — S62.90XA UNSPECIFIED FRACTURE OF UNSPECIFIED WRIST AND HAND, INITIAL ENCOUNTER FOR CLOSED FRACTURE: Chronic | ICD-10-CM

## 2018-05-14 DIAGNOSIS — Z98.890 OTHER SPECIFIED POSTPROCEDURAL STATES: Chronic | ICD-10-CM

## 2018-05-14 PROCEDURE — 71250 CT THORAX DX C-: CPT | Mod: 26

## 2018-05-14 PROCEDURE — 71250 CT THORAX DX C-: CPT

## 2018-05-22 ENCOUNTER — APPOINTMENT (OUTPATIENT)
Dept: RADIATION ONCOLOGY | Facility: CLINIC | Age: 58
End: 2018-05-22

## 2018-05-23 ENCOUNTER — APPOINTMENT (OUTPATIENT)
Dept: RADIATION ONCOLOGY | Facility: CLINIC | Age: 58
End: 2018-05-23

## 2018-06-12 ENCOUNTER — APPOINTMENT (OUTPATIENT)
Dept: RADIATION ONCOLOGY | Facility: CLINIC | Age: 58
End: 2018-06-12

## 2018-11-19 ENCOUNTER — FORM ENCOUNTER (OUTPATIENT)
Age: 58
End: 2018-11-19

## 2018-11-19 PROBLEM — E66.9 OBESITY, UNSPECIFIED: Chronic | Status: ACTIVE | Noted: 2018-01-10

## 2018-11-19 PROBLEM — D23.70 OTHER BENIGN NEOPLASM OF SKIN OF UNSPECIFIED LOWER LIMB, INCLUDING HIP: Chronic | Status: ACTIVE | Noted: 2018-01-10

## 2018-11-19 PROBLEM — N40.0 BENIGN PROSTATIC HYPERPLASIA WITHOUT LOWER URINARY TRACT SYMPTOMS: Chronic | Status: ACTIVE | Noted: 2018-01-10

## 2018-11-20 ENCOUNTER — APPOINTMENT (OUTPATIENT)
Dept: CT IMAGING | Facility: CLINIC | Age: 58
End: 2018-11-20
Payer: MEDICAID

## 2018-11-20 ENCOUNTER — OUTPATIENT (OUTPATIENT)
Dept: OUTPATIENT SERVICES | Facility: HOSPITAL | Age: 58
LOS: 1 days | End: 2018-11-20
Payer: MEDICAID

## 2018-11-20 DIAGNOSIS — Z98.890 OTHER SPECIFIED POSTPROCEDURAL STATES: Chronic | ICD-10-CM

## 2018-11-20 DIAGNOSIS — S62.90XA UNSPECIFIED FRACTURE OF UNSPECIFIED WRIST AND HAND, INITIAL ENCOUNTER FOR CLOSED FRACTURE: Chronic | ICD-10-CM

## 2018-11-20 DIAGNOSIS — C49.21 MALIGNANT NEOPLASM OF CONNECTIVE AND SOFT TISSUE OF RIGHT LOWER LIMB, INCLUDING HIP: ICD-10-CM

## 2018-11-20 PROCEDURE — 71250 CT THORAX DX C-: CPT

## 2018-11-20 PROCEDURE — 71250 CT THORAX DX C-: CPT | Mod: 26

## 2019-02-11 ENCOUNTER — APPOINTMENT (OUTPATIENT)
Dept: SURGICAL ONCOLOGY | Facility: CLINIC | Age: 59
End: 2019-02-11
Payer: MEDICAID

## 2019-02-11 VITALS
DIASTOLIC BLOOD PRESSURE: 81 MMHG | HEIGHT: 69 IN | WEIGHT: 215 LBS | HEART RATE: 43 BPM | OXYGEN SATURATION: 98 % | SYSTOLIC BLOOD PRESSURE: 145 MMHG | BODY MASS INDEX: 31.84 KG/M2 | RESPIRATION RATE: 16 BRPM

## 2019-02-11 PROCEDURE — 99214 OFFICE O/P EST MOD 30 MIN: CPT

## 2019-11-03 PROBLEM — C44.99 DERMATOFIBROSARCOMA PROTUBERANS: Status: ACTIVE | Noted: 2019-02-11

## 2019-11-04 ENCOUNTER — APPOINTMENT (OUTPATIENT)
Dept: SURGICAL ONCOLOGY | Facility: CLINIC | Age: 59
End: 2019-11-04
Payer: MEDICAID

## 2019-11-04 VITALS
HEART RATE: 40 BPM | SYSTOLIC BLOOD PRESSURE: 186 MMHG | DIASTOLIC BLOOD PRESSURE: 91 MMHG | OXYGEN SATURATION: 99 % | HEIGHT: 69 IN | WEIGHT: 220 LBS | RESPIRATION RATE: 16 BRPM | BODY MASS INDEX: 32.58 KG/M2

## 2019-11-04 DIAGNOSIS — C44.99 OTHER SPECIFIED MALIGNANT NEOPLASM OF SKIN, UNSPECIFIED: ICD-10-CM

## 2019-11-04 PROCEDURE — 99214 OFFICE O/P EST MOD 30 MIN: CPT

## 2019-12-13 ENCOUNTER — FORM ENCOUNTER (OUTPATIENT)
Age: 59
End: 2019-12-13

## 2019-12-14 ENCOUNTER — APPOINTMENT (OUTPATIENT)
Dept: RADIOLOGY | Facility: CLINIC | Age: 59
End: 2019-12-14
Payer: MEDICAID

## 2019-12-14 ENCOUNTER — APPOINTMENT (OUTPATIENT)
Dept: CT IMAGING | Facility: CLINIC | Age: 59
End: 2019-12-14
Payer: MEDICAID

## 2019-12-14 ENCOUNTER — OUTPATIENT (OUTPATIENT)
Dept: OUTPATIENT SERVICES | Facility: HOSPITAL | Age: 59
LOS: 1 days | End: 2019-12-14
Payer: COMMERCIAL

## 2019-12-14 ENCOUNTER — APPOINTMENT (OUTPATIENT)
Dept: ULTRASOUND IMAGING | Facility: CLINIC | Age: 59
End: 2019-12-14
Payer: MEDICAID

## 2019-12-14 DIAGNOSIS — C44.99 OTHER SPECIFIED MALIGNANT NEOPLASM OF SKIN, UNSPECIFIED: ICD-10-CM

## 2019-12-14 DIAGNOSIS — Z98.890 OTHER SPECIFIED POSTPROCEDURAL STATES: Chronic | ICD-10-CM

## 2019-12-14 DIAGNOSIS — Z00.00 ENCOUNTER FOR GENERAL ADULT MEDICAL EXAMINATION WITHOUT ABNORMAL FINDINGS: ICD-10-CM

## 2019-12-14 DIAGNOSIS — S62.90XA UNSPECIFIED FRACTURE OF UNSPECIFIED WRIST AND HAND, INITIAL ENCOUNTER FOR CLOSED FRACTURE: Chronic | ICD-10-CM

## 2019-12-14 PROCEDURE — 76882 US LMTD JT/FCL EVL NVASC XTR: CPT | Mod: 26,RT

## 2019-12-14 PROCEDURE — 73502 X-RAY EXAM HIP UNI 2-3 VIEWS: CPT | Mod: 26,RT

## 2019-12-14 PROCEDURE — 71250 CT THORAX DX C-: CPT | Mod: 26

## 2019-12-14 PROCEDURE — 73502 X-RAY EXAM HIP UNI 2-3 VIEWS: CPT

## 2019-12-14 PROCEDURE — 76882 US LMTD JT/FCL EVL NVASC XTR: CPT

## 2019-12-14 PROCEDURE — 71250 CT THORAX DX C-: CPT

## 2019-12-28 NOTE — REASON FOR VISIT
[Follow-Up Visit] : a follow-up visit for [Other: _____] : [unfilled] [FreeTextEntry2] : Right hip dermatofibrosarcoma Protuberant

## 2019-12-28 NOTE — ASSESSMENT
[FreeTextEntry1] : February 2018 CT chest @[facility]. demonstrated two sub-cm right pulmonary nodules.\par May 2018 followup CT demonstrated no interval change.\par November 2018 (most recent followup) interval stability.\par Followup CT November 2019..............................\par \par Clinically doing well.\par \par I've asked to see him in another 6 months, sooner if needed

## 2019-12-28 NOTE — HISTORY OF PRESENT ILLNESS
[de-identified] : 58-year-old man who had been referred November 2017 with a dysplastic nevus of the dorsum of the right foot, and resected with negative margins, and reconstruction by Dr. Mcgregor.\par \par On his index presentation, there was a palpable mass in the outer right hip/thigh.\par He had an index resection in 2002 followed by TWO additional operations for recurrence, in [place], but he does not recall the exact name of the facility or the physician.\par No adjuvant therapy was recommended.\par \par The initial evaluation of the right hip mass was with a sonogram that demonstrated a solid mass.\par November 2017 image guided core needle biopsy demonstrated a spindle cell neoplasm.\par \par \par \par January 2018 he had resection of the recurrent right hip mass, with reconstruction by Dr. 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 Manav Chicas.\par He had a concomitant resection of the dysplastic nevus from the top of his right foot, with negative margins and reconstruction.\par \par \par November 2017 he was referred by Dr. Breanna JASSO with a recent diagnosis of a junctional dysplastic nevus on the top of his right foot, which involves the microscopic margins.\par \par This was an asymptomatic pigmented lesion discovered on a dermatologic survey.\par \par The visit to the dermatologist was prompted by his internist, who he is seen regarding a "lump" in the outer right thigh, which is asymptomatic, that he has been aware of for at least 6-8 weeks.\par There is no preceding trauma.\par \par In 2002 he had resection of a mass in the outer right thigh, followed by 2 subsequent surgical procedures, all at a facility in  [place] (he does not recall the name).\par He did not require any adjuvant therapy.\par He did not have any specific followup for the problem.\par \par There is no known previous personal history of malignancy.\par \par Relatives with history of cancer include:\par Father, prostate cancer\par Brother, stomach cancer.\par Another brother renal cell carcinoma.\par Sister with lung cancer.\par \par His internist is Dr. Can CHIU ([phone]).\par He does not have a pacemaker or defibrillator.\par He does not take any prescription medications.\par \par He has never had a colonoscopy\par \par

## 2020-01-06 ENCOUNTER — FORM ENCOUNTER (OUTPATIENT)
Age: 60
End: 2020-01-06

## 2020-01-07 ENCOUNTER — LABORATORY RESULT (OUTPATIENT)
Age: 60
End: 2020-01-07

## 2020-01-07 ENCOUNTER — APPOINTMENT (OUTPATIENT)
Dept: INTERVENTIONAL RADIOLOGY/VASCULAR | Facility: CLINIC | Age: 60
End: 2020-01-07
Payer: COMMERCIAL

## 2020-01-07 ENCOUNTER — OUTPATIENT (OUTPATIENT)
Dept: OUTPATIENT SERVICES | Facility: HOSPITAL | Age: 60
LOS: 1 days | End: 2020-01-07

## 2020-01-07 DIAGNOSIS — Z00.8 ENCOUNTER FOR OTHER GENERAL EXAMINATION: ICD-10-CM

## 2020-01-07 DIAGNOSIS — S62.90XA UNSPECIFIED FRACTURE OF UNSPECIFIED WRIST AND HAND, INITIAL ENCOUNTER FOR CLOSED FRACTURE: Chronic | ICD-10-CM

## 2020-01-07 DIAGNOSIS — Z98.890 OTHER SPECIFIED POSTPROCEDURAL STATES: Chronic | ICD-10-CM

## 2020-01-07 PROCEDURE — 76942 ECHO GUIDE FOR BIOPSY: CPT | Mod: 26

## 2020-01-07 PROCEDURE — 10160 PNXR ASPIR ABSC HMTMA BULLA: CPT

## 2020-01-13 NOTE — ASSESSMENT
[FreeTextEntry1] : February 2018 CT chest @[facility]. demonstrated two sub-cm right pulmonary nodules.\par May 2018 followup CT demonstrated no interval change.\par November 2018 (most recent followup) interval stability.\par Followup CT November 2019..- Prescription Entered\par \par Due to the symptoms in the right hip and upper thigh region, it included prescriptions for an ultrasound of the area and an x-ray of the pelvis.\par If there are normal, and the symptoms persist, would likely need further imaging with MRI or CT.........................\par \par If no imaging abnormalities, they benefit from referral to PM&R................................\par \par Reviewed in detail, all questions answered\par \par I've asked to see him in another 6 months, sooner if needed\par \par \par 12-28-19:\par On 12-14-19:\par CT chest at [facility]: No evidence of metastatic disease.\par Stable, tiny, subpleural densities within the apices, stable since November 2018.\par We'll repeat December 2020.......................................\par Accompanying, complex postoperative fluid collection containing echogenic linear degrees along the length of the scar at the right lateral measuring 10 x 1.6 x 5 cm.\par The plain films of the pelvis demonstrate no osseous lesions\par I tried calling his fiancée, but there was no answer, and the mailbox was full.\par I submitted a prescription for sonogram guided aspiration of the fluid for symptomatic relief, and diagnostic purposes.\par \par \par 01-07-20.\par He had sonogram guided aspiration of the right hip/thigh fluid collection at [place].\par 40 cc of dark bloody fluid were aspirated with resolution of the collection after the procedure.\par No abnormal cells on cytology.\par Unremarkable blood count.\par Cultures negative.

## 2020-01-13 NOTE — HISTORY OF PRESENT ILLNESS
[de-identified] : 59 year-old man, referred November 2017 by dermatology (Dr Breanna JASSO) with a dysplastic nevus of the dorsum of the right foot.\par \par At that presentation, there was a palpable mass in the outer right hip/thigh.\par It was originally resected in 2002 followed by TWO additional operations for recurrence, in [place]; but he does not recall the exact name of the facility or the physician.\par No adjuvant therapy was recommended.\par \par Right hip mass sonogram demonstrated a solid mass.\par November 2017 image guided core needle biopsy demonstrated a spindle cell neoplasm.\par \par \par January 2018 he had resection of the recurrent right hip mass, with reconstruction by Dr. 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 Manav Chicas.\par \par The dysplastic nevus from the top of his right foot, was also resected with negative margins and reconstruction.\par \par He reports pain in the outer right hip, the site of resection, with limited range of motion, with the symptoms worsening as the day goes on.\par Other than surgery and, no preceding trauma.\par Presently, no other signs or symptoms.\par No other provocative a palliative factors.\par \par \par No known previous personal history of malignancy.\par \par Relatives with history of cancer include:\par Father, prostate cancer\par Brother, stomach cancer.\par Another brother renal cell carcinoma.\par Sister with lung cancer.\par \par His internist is Dr. Can CHIU ([phone]).\par \par He does not have a pacemaker or defibrillator.\par \par He does not take any prescription medications.\par He has been prescribed antihypertensives and a diuretic, but does not take them\par \par He has never had a colonoscopy., still

## 2020-04-10 NOTE — ASU PATIENT PROFILE, ADULT - NS PRO MODE OF ARRIVAL
Patient called and states she has mid back pain and tightness that wraps around her left side and radiates up into neck and majority of tightness is on right side.     Please advise.     Bp was 128/88  Patient states she usually has really low bp and the pharmacy took her bp for her.    ambulatory

## 2020-05-18 ENCOUNTER — APPOINTMENT (OUTPATIENT)
Dept: SURGICAL ONCOLOGY | Facility: CLINIC | Age: 60
End: 2020-05-18

## 2021-04-01 ENCOUNTER — EMERGENCY (EMERGENCY)
Facility: HOSPITAL | Age: 61
LOS: 1 days | Discharge: STILL ADMITTED OR ADMINISTRATIVE | End: 2021-04-01
Attending: EMERGENCY MEDICINE
Payer: MEDICAID

## 2021-04-01 VITALS
TEMPERATURE: 98 F | RESPIRATION RATE: 18 BRPM | OXYGEN SATURATION: 99 % | SYSTOLIC BLOOD PRESSURE: 207 MMHG | DIASTOLIC BLOOD PRESSURE: 87 MMHG | WEIGHT: 229.94 LBS | HEIGHT: 69 IN | HEART RATE: 47 BPM

## 2021-04-01 DIAGNOSIS — Z98.890 OTHER SPECIFIED POSTPROCEDURAL STATES: Chronic | ICD-10-CM

## 2021-04-01 DIAGNOSIS — S62.90XA UNSPECIFIED FRACTURE OF UNSPECIFIED WRIST AND HAND, INITIAL ENCOUNTER FOR CLOSED FRACTURE: Chronic | ICD-10-CM

## 2021-04-01 LAB
ALBUMIN SERPL ELPH-MCNC: 4.7 G/DL — SIGNIFICANT CHANGE UP (ref 3.3–5.2)
ALP SERPL-CCNC: 48 U/L — SIGNIFICANT CHANGE UP (ref 40–120)
ALT FLD-CCNC: 33 U/L — SIGNIFICANT CHANGE UP
ANION GAP SERPL CALC-SCNC: 11 MMOL/L — SIGNIFICANT CHANGE UP (ref 5–17)
APTT BLD: 34.7 SEC — SIGNIFICANT CHANGE UP (ref 27.5–35.5)
AST SERPL-CCNC: 30 U/L — SIGNIFICANT CHANGE UP
BASOPHILS # BLD AUTO: 0.04 K/UL — SIGNIFICANT CHANGE UP (ref 0–0.2)
BASOPHILS NFR BLD AUTO: 1 % — SIGNIFICANT CHANGE UP (ref 0–2)
BILIRUB SERPL-MCNC: 0.5 MG/DL — SIGNIFICANT CHANGE UP (ref 0.4–2)
BLD GP AB SCN SERPL QL: SIGNIFICANT CHANGE UP
BUN SERPL-MCNC: 17 MG/DL — SIGNIFICANT CHANGE UP (ref 8–20)
CALCIUM SERPL-MCNC: 9.8 MG/DL — SIGNIFICANT CHANGE UP (ref 8.6–10.2)
CHLORIDE SERPL-SCNC: 102 MMOL/L — SIGNIFICANT CHANGE UP (ref 98–107)
CO2 SERPL-SCNC: 25 MMOL/L — SIGNIFICANT CHANGE UP (ref 22–29)
CREAT SERPL-MCNC: 0.9 MG/DL — SIGNIFICANT CHANGE UP (ref 0.5–1.3)
EOSINOPHIL # BLD AUTO: 0.21 K/UL — SIGNIFICANT CHANGE UP (ref 0–0.5)
EOSINOPHIL NFR BLD AUTO: 5.3 % — SIGNIFICANT CHANGE UP (ref 0–6)
GLUCOSE SERPL-MCNC: 92 MG/DL — SIGNIFICANT CHANGE UP (ref 70–99)
HCT VFR BLD CALC: 43 % — SIGNIFICANT CHANGE UP (ref 39–50)
HGB BLD-MCNC: 14.6 G/DL — SIGNIFICANT CHANGE UP (ref 13–17)
IMM GRANULOCYTES NFR BLD AUTO: 0.5 % — SIGNIFICANT CHANGE UP (ref 0–1.5)
INR BLD: 1.06 RATIO — SIGNIFICANT CHANGE UP (ref 0.88–1.16)
LYMPHOCYTES # BLD AUTO: 1.26 K/UL — SIGNIFICANT CHANGE UP (ref 1–3.3)
LYMPHOCYTES # BLD AUTO: 31.5 % — SIGNIFICANT CHANGE UP (ref 13–44)
MAGNESIUM SERPL-MCNC: 1.9 MG/DL — SIGNIFICANT CHANGE UP (ref 1.6–2.6)
MCHC RBC-ENTMCNC: 29.6 PG — SIGNIFICANT CHANGE UP (ref 27–34)
MCHC RBC-ENTMCNC: 34 GM/DL — SIGNIFICANT CHANGE UP (ref 32–36)
MCV RBC AUTO: 87.2 FL — SIGNIFICANT CHANGE UP (ref 80–100)
MONOCYTES # BLD AUTO: 0.46 K/UL — SIGNIFICANT CHANGE UP (ref 0–0.9)
MONOCYTES NFR BLD AUTO: 11.5 % — SIGNIFICANT CHANGE UP (ref 2–14)
NEUTROPHILS # BLD AUTO: 2.01 K/UL — SIGNIFICANT CHANGE UP (ref 1.8–7.4)
NEUTROPHILS NFR BLD AUTO: 50.2 % — SIGNIFICANT CHANGE UP (ref 43–77)
NT-PROBNP SERPL-SCNC: 38 PG/ML — SIGNIFICANT CHANGE UP (ref 0–300)
PLATELET # BLD AUTO: 176 K/UL — SIGNIFICANT CHANGE UP (ref 150–400)
POTASSIUM SERPL-MCNC: 3.8 MMOL/L — SIGNIFICANT CHANGE UP (ref 3.5–5.3)
POTASSIUM SERPL-SCNC: 3.8 MMOL/L — SIGNIFICANT CHANGE UP (ref 3.5–5.3)
PROT SERPL-MCNC: 7.6 G/DL — SIGNIFICANT CHANGE UP (ref 6.6–8.7)
PROTHROM AB SERPL-ACNC: 12.3 SEC — SIGNIFICANT CHANGE UP (ref 10.6–13.6)
RBC # BLD: 4.93 M/UL — SIGNIFICANT CHANGE UP (ref 4.2–5.8)
RBC # FLD: 12.5 % — SIGNIFICANT CHANGE UP (ref 10.3–14.5)
SODIUM SERPL-SCNC: 138 MMOL/L — SIGNIFICANT CHANGE UP (ref 135–145)
TROPONIN T SERPL-MCNC: <0.01 NG/ML — SIGNIFICANT CHANGE UP (ref 0–0.06)
TROPONIN T SERPL-MCNC: <0.01 NG/ML — SIGNIFICANT CHANGE UP (ref 0–0.06)
WBC # BLD: 4 K/UL — SIGNIFICANT CHANGE UP (ref 3.8–10.5)
WBC # FLD AUTO: 4 K/UL — SIGNIFICANT CHANGE UP (ref 3.8–10.5)

## 2021-04-01 PROCEDURE — 75574 CT ANGIO HRT W/3D IMAGE: CPT | Mod: 26,MA

## 2021-04-01 PROCEDURE — 93010 ELECTROCARDIOGRAM REPORT: CPT

## 2021-04-01 PROCEDURE — 99220: CPT

## 2021-04-01 PROCEDURE — 71045 X-RAY EXAM CHEST 1 VIEW: CPT | Mod: 26

## 2021-04-01 PROCEDURE — 99285 EMERGENCY DEPT VISIT HI MDM: CPT

## 2021-04-01 RX ORDER — AMLODIPINE BESYLATE 2.5 MG/1
5 TABLET ORAL DAILY
Refills: 0 | Status: DISCONTINUED | OUTPATIENT
Start: 2021-04-01 | End: 2021-04-05

## 2021-04-01 RX ORDER — SODIUM CHLORIDE 9 MG/ML
500 INJECTION INTRAMUSCULAR; INTRAVENOUS; SUBCUTANEOUS ONCE
Refills: 0 | Status: COMPLETED | OUTPATIENT
Start: 2021-04-01 | End: 2021-04-01

## 2021-04-01 RX ORDER — LISINOPRIL 2.5 MG/1
10 TABLET ORAL DAILY
Refills: 0 | Status: DISCONTINUED | OUTPATIENT
Start: 2021-04-01 | End: 2021-04-05

## 2021-04-01 RX ORDER — HYDROCHLOROTHIAZIDE 25 MG
12.5 TABLET ORAL DAILY
Refills: 0 | Status: DISCONTINUED | OUTPATIENT
Start: 2021-04-01 | End: 2021-04-05

## 2021-04-01 RX ORDER — AMLODIPINE BESYLATE 2.5 MG/1
10 TABLET ORAL ONCE
Refills: 0 | Status: DISCONTINUED | OUTPATIENT
Start: 2021-04-01 | End: 2021-04-01

## 2021-04-01 RX ADMIN — AMLODIPINE BESYLATE 5 MILLIGRAM(S): 2.5 TABLET ORAL at 16:37

## 2021-04-01 RX ADMIN — SODIUM CHLORIDE 500 MILLILITER(S): 9 INJECTION INTRAMUSCULAR; INTRAVENOUS; SUBCUTANEOUS at 16:25

## 2021-04-01 NOTE — ED ADULT NURSE REASSESSMENT NOTE - NS ED NURSE REASSESS COMMENT FT1
Assumed pt care @ this time. Report received from day KAMILA Holt. Pt A&Ox4 w/no complaints @ this time. Pt denies any SOB, chest pain, dizziness, lightheadedness. Respirations even & unlabored. NAD present. Pt given new linen and resting comfortably in stretcher @ this time. Pt has call bell within reach. Pt made aware of plan of care and verbalized understanding.

## 2021-04-01 NOTE — CONSULT NOTE ADULT - SUBJECTIVE AND OBJECTIVE BOX
[place] [facility]                                                               Office:  [address] [address]                                                              Telephone: [phone]. Fax:[phone]                                                                        CARDIOLOGY CONSULTATION NOTE                                                                                             Consult requested by:  Dr. Emery  Reason for Consultation: Bradycardia  History obtained by: Patient and medical record   obtained: No    Covid Status: Pending    Chief complaint:    Patient is a 60y old  Male who presents with a chief complaint of bradycardia.      HPI: 59 y/o M with a PMHx of HTN (on lisinopril) who was sent from his PCP office for bradycardia and hypertension. Patient states that he saw his PMD a few weeks ago and was noted to be bradycardic, and was advised to f/u today to re-evaluate. Patient was found to be hypertensive and bradycardic again, so PMD gave patient PO clonidine prior to transfer to the ER. Patient denies any syncope, dizziness, or near syncope, but states that for the last few weeks h e has been experiencing chest pain. Patient states that he randomly gets 30 seconds of substernal chest pain, non-radiating, 7/10, that just resolves on its own. Patient states that his METS>4 with no chest pain or dyspnea on exertion. Patient also notes that the pain is often worse with eating. Patient is currently resting comfortably but noted to have a HR in the 40s with frequent junctional beats. Patient denies any fevers, chills, SOB, syncope, near syncope, abdominal pain, N/V/D, headache, or dizziness.     REVIEW OF SYMPTOMS:     CONSTITUTIONAL: No fever, weight loss, or fatigue  ENMT:  No difficulty hearing, tinnitus, vertigo; No sinus or throat pain  NECK: No pain or stiffness  CARDIOVASCULAR: AS PER HPI  RESPIRATORY: AS PER HPI  : No dysuria, no hematuria   GI: No dark color stool, no melena, no diarrhea, no constipation, no abdominal pain   NEURO: No headache, no dizziness, no slurred speech   MUSCULOSKELETAL: No joint pain or swelling; No muscle, back, or extremity pain  PSYCH: No agitation, no anxiety.    ALL OTHER REVIEW OF SYSTEMS ARE NEGATIVE.      PREVIOUS DIAGNOSTIC TESTING  ECHO FINDINGS: Pending      ALLERGIES: Allergies    No Known Allergies    Intolerances        PAST MEDICAL HISTORY  Skin cancer    Testicular abnormality    BPH (benign prostatic hyperplasia)    Other benign neoplasm of skin of unspecified lower limb, including hip    Obesity (BMI 30.0-34.9)        PAST SURGICAL HISTORY  Fracture of hand    S/P hardware removal        FAMILY HISTORY: Denies      SOCIAL HISTORY:   CIGARETTES:   Former smoker, states he only smoked for 1 year a few years ago  ALCOHOL: Drinks 1 small bottle of [place] on weekends  DRUGS: Denies      CURRENT MEDICATIONS:         HOME MEDICATIONS:  Lisionpril    Vital Signs Last 24 Hrs  T(C): 36.9 (01 Apr 2021 11:51), Max: 36.9 (01 Apr 2021 11:51)  T(F): 98.5 (01 Apr 2021 11:51), Max: 98.5 (01 Apr 2021 11:51)  HR: 47 (01 Apr 2021 11:51) (47 - 47)  BP: 207/87 (01 Apr 2021 11:51) (207/87 - 207/87)  RR: 18 (01 Apr 2021 11:51) (18 - 18)  SpO2: 99% (01 Apr 2021 11:51) (99% - 99%)      PHYSICAL EXAM:  Constitutional: Comfortable . No acute distress.   HEENT: Atraumatic and normocephalic , neck is supple . no JVD. No carotid bruit. PEERL   CNS: A&Ox3. No focal deficits. EOMI. Cranial nerves II-IX are intact.   Lymph Nodes: Cervical : Not palpable.  Respiratory: CTAB  Cardiovascular: Bradycardic S1S2. No murmur/rubs or gallop.  Gastrointestinal: Soft non-tender and non distended . +Bowel sounds. negative Loza's sign.  Extremities: No edema.   Psychiatric: Calm . no agitation.  Skin: No skin rash/ulcers visualized to face, hands or feet.    Intake and output:     LABS:                        14.6   4.00  )-----------( 176      ( 01 Apr 2021 11:53 )             43.0     04-01    138  |  102  |  17.0  ----------------------------<  92  3.8   |  25.0  |  0.90    Ca    9.8      01 Apr 2021 11:53  Mg     1.9     04-01    TPro  7.6  /  Alb  4.7  /  TBili  0.5  /  DBili  x   /  AST  30  /  ALT  33  /  AlkPhos  48  04-01    CARDIAC MARKERS ( 01 Apr 2021 11:53 )  x     / <0.01 ng/mL / x     / x     / x        ;p-BNP=Serum Pro-Brain Natriuretic Peptide: 38 pg/mL (04-01 @ 11:53)    PT/INR - ( 01 Apr 2021 11:53 )   PT: 12.3 sec;   INR: 1.06 ratio         PTT - ( 01 Apr 2021 11:53 )  PTT:34.7 sec      INTERPRETATION OF TELEMETRY: Reviewed by me. Sinus bradycardia with frequent junctional beats  ECG: Reviewed by me.  Sinus bradycardia with frequent junctional beats, PRWP, NSST/T wave abnormalities     RADIOLOGY & ADDITIONAL STUDIES:    X-ray:  reviewed by me.     CT scan:   MRI:

## 2021-04-01 NOTE — ED PROVIDER NOTE - PHYSICAL EXAMINATION
Gen: no acute distress  Head: normocephalic, atraumatic  Lung: CTAB, no respiratory distress, no wheezing, rales, rhonchi  CV: normal s1/s2, bradycardic, irregular rhythm  Abd: soft, non-tender, non-distended  MSK: No edema, no visible deformities, full range of motion in all 4 extremities  Neuro: No focal neurologic deficits  Skin: No rash   Psych: normal affect

## 2021-04-01 NOTE — ED ADULT TRIAGE NOTE - CHIEF COMPLAINT QUOTE
pt A&Ox 4 BIBA from MD office for HTN and bradycardia. Pt reports intermittent midsternal chest pain & dizziness x 1 month since being diagnosed with covid. Given 0.1mg clonidine PTA. Pt brought to critical care with Dr Santana at bedside

## 2021-04-01 NOTE — ED ADULT NURSE REASSESSMENT NOTE - NS ED NURSE REASSESS COMMENT FT1
Patient transferred to observation unit CDU 9. Patient A&Ox4. No s/s of acute distress. Patient denies any CP/SOB or dizziness. Sinus nilton at CM, HR 40-45bmp. PIV patent. Patient pending CT scan and TTE read. Patient in understanding of plan of care. Patient with no further questions for the RN. Resting in comfort. Call bell within reach and encouraged to use when assistance needed. Will continue to monitor.

## 2021-04-01 NOTE — ED ADULT NURSE NOTE - OBJECTIVE STATEMENT
pt A&OX4, pt sent from PCP for bradycardia and HTN, pt was given lisinopril 12.5 @ PCP, pt c/o intermittent chest pain x2 months, resp even and unlabored no distress noted, abd soft NTND, pt denies fever/chills, dizziness, sob, abd pain n/v/d pt A&OX4, pt sent from PCP for bradycardia and HTN, pt was given lisinopril 12.5 @ PCP, pt c/o intermittent chest pain and dizziness x2 months pt dx with covid 2 months ago  , resp even and unlabored no distress noted, abd soft NTND, pt denies fever/chills, sob, abd pain n/v/d

## 2021-04-01 NOTE — ED PROVIDER NOTE - ATTENDING CONTRIBUTION TO CARE
pleasant adult male with asymptomatic bradycardia; noted hypertension; sent in from office; no chest pain no SOB; patient with resting heart rate 39-50, sinus with junctional escape beats; on exam, NAD, well appearing, no JVD, clear to auscultation; abd soft nt nd; seen by cards, will place in obs

## 2021-04-01 NOTE — CONSULT NOTE ADULT - ASSESSMENT
A/P: 61 y/o M with a PMHx of HTN (on lisinopril) who was sent from his PCP office for bradycardia and hypertension. Patient states that he saw his PMD a few weeks ago and was noted to be bradycardic, and was advised to f/u today to re-evaluate. Patient was found to be hypertensive and bradycardic again, so PMD gave patient PO clonidine prior to transfer to the ER. Patient denies any syncope, dizziness, or near syncope, but states that for the last few weeks h e has been experiencing chest pain. Patient states that he randomly gets 30 seconds of substernal chest pain, non-radiating, 7/10, that just resolves on its own. Patient states that his METS>4 with no chest pain or dyspnea on exertion. Patient also notes that the pain is often worse with eating. Patient is currently resting comfortably but noted to have a HR in the 40s with frequent junctional beats. Patient denies any fevers, chills, SOB, syncope, near syncope, abdominal pain, N/V/D, headache, or dizziness.   Troponin negative x 1  BNP 38    Bradycardia  - Frequent junctional beats with SB in the 40s.   - No s/s of syncope or near syncope.   - Likely exacerbated by clonidine.   - Obtain CTA cardiac.   - Check TSH and Lyme titres.   - Avoid AV nodals.   - Monitor on telemetry overnight.     HTN  - Uncontrolled.   - Add norvasc 10mg PO qday.   - Can give IV hydralazine if needed for SBP continued >160.   - Continue lisinopril.     Assessment and recommendations are final when note is signed by the attending.

## 2021-04-01 NOTE — ED CDU PROVIDER INITIAL DAY NOTE - ATTENDING CONTRIBUTION TO CARE
seen with resident: plan for obs stay for cardiac ct, echo, and further EP eval; patient with asymptomatic bradycardia, sinus nilton with junctional escape, otherwise normal exam and labs, not on any av eron blockers.

## 2021-04-01 NOTE — ED PROVIDER NOTE - OBJECTIVE STATEMENT
59 y/o M pt with hx of HTN presenting today from pcp's office with c/o bradycardia. Pt states that he went to his pcp today for a regularly scheduled appt. Had an appt several weeks ago and was found to be bradycardic. Pt was scheduled for a f/u appt, and today was again found to be bradycardic and pt was sent to the ED for further eval. Pt describes an intermittent, non-specific, substernal chest pain that is non-radiating, non-exertional, and non-pleuretic. Pt was found to be hypertensive in clnic and given po clonadine prior to txfer. Pt denies any dyspnea, fevers, n/v/d, abdominal pain, dysuria, headache, cough, congestion, sore throat, neck pain, back pain, weakness, numbness, tingling, dizziness, syncope, or other complaint.

## 2021-04-01 NOTE — ED ADULT NURSE REASSESSMENT NOTE - NS ED NURSE REASSESS COMMENT FT1
Assumed care of the patient at 1411. Verbal report received from [facility] ED. Patient currently in CT scan testing. Patient to be transferred to observation unit CDU 9 after test. Will ata.

## 2021-04-01 NOTE — ED PROVIDER NOTE - PROGRESS NOTE DETAILS
Pt evaluated by cardio, requesting echo and ctca. WIll place pt in obs. Reviewed all results with pt as well as plans for observation admission. Pt is comfortable with plan for obs stay. Questions answered. - Tirso Emery, PGY-2

## 2021-04-01 NOTE — ED PROVIDER NOTE - CLINICAL SUMMARY MEDICAL DECISION MAKING FREE TEXT BOX
Pt with hx of htn presenting with asymptomatic bradycardia associated with mild chest pain. VSS, pt awake and alert. Will order labs, trop, ekg, bnp, mag, cxr, cardio cx, reeval.

## 2021-04-01 NOTE — ED ADULT NURSE REASSESSMENT NOTE - NS ED NURSE REASSESS COMMENT FT1
Pt gave RN phone to charge. Phone placed in lock box. Code 1107. Pt aware. Will give phone back once charged.

## 2021-04-01 NOTE — ED CDU PROVIDER INITIAL DAY NOTE - MEDICAL DECISION MAKING DETAILS
Pt presenting to the ED for asymptomatic bradycardia with ?junctional escape rhythm. Labs unremarkable, cardio recs ctca and echo. Pt placed in obs for ctca and echo

## 2021-04-02 VITALS
HEART RATE: 40 BPM | SYSTOLIC BLOOD PRESSURE: 128 MMHG | TEMPERATURE: 98 F | DIASTOLIC BLOOD PRESSURE: 71 MMHG | OXYGEN SATURATION: 97 % | RESPIRATION RATE: 16 BRPM

## 2021-04-02 LAB
B BURGDOR C6 AB SER-ACNC: NEGATIVE — SIGNIFICANT CHANGE UP
B BURGDOR IGG+IGM SER-ACNC: 0.19 INDEX — SIGNIFICANT CHANGE UP (ref 0.01–0.89)

## 2021-04-02 PROCEDURE — 36415 COLL VENOUS BLD VENIPUNCTURE: CPT

## 2021-04-02 PROCEDURE — 80053 COMPREHEN METABOLIC PANEL: CPT

## 2021-04-02 PROCEDURE — 71045 X-RAY EXAM CHEST 1 VIEW: CPT

## 2021-04-02 PROCEDURE — G0378: CPT

## 2021-04-02 PROCEDURE — 36000 PLACE NEEDLE IN VEIN: CPT

## 2021-04-02 PROCEDURE — 84484 ASSAY OF TROPONIN QUANT: CPT

## 2021-04-02 PROCEDURE — 83880 ASSAY OF NATRIURETIC PEPTIDE: CPT

## 2021-04-02 PROCEDURE — 93005 ELECTROCARDIOGRAM TRACING: CPT

## 2021-04-02 PROCEDURE — 99284 EMERGENCY DEPT VISIT MOD MDM: CPT | Mod: 25

## 2021-04-02 PROCEDURE — 99222 1ST HOSP IP/OBS MODERATE 55: CPT

## 2021-04-02 PROCEDURE — 84443 ASSAY THYROID STIM HORMONE: CPT

## 2021-04-02 PROCEDURE — 86618 LYME DISEASE ANTIBODY: CPT

## 2021-04-02 PROCEDURE — 85025 COMPLETE CBC W/AUTO DIFF WBC: CPT

## 2021-04-02 PROCEDURE — 86850 RBC ANTIBODY SCREEN: CPT

## 2021-04-02 PROCEDURE — 86900 BLOOD TYPING SEROLOGIC ABO: CPT

## 2021-04-02 PROCEDURE — 85730 THROMBOPLASTIN TIME PARTIAL: CPT

## 2021-04-02 PROCEDURE — 83735 ASSAY OF MAGNESIUM: CPT

## 2021-04-02 PROCEDURE — 75574 CT ANGIO HRT W/3D IMAGE: CPT

## 2021-04-02 PROCEDURE — 85610 PROTHROMBIN TIME: CPT

## 2021-04-02 PROCEDURE — 86901 BLOOD TYPING SEROLOGIC RH(D): CPT

## 2021-04-02 PROCEDURE — C8929: CPT

## 2021-04-02 PROCEDURE — 99217: CPT

## 2021-04-02 RX ORDER — AMLODIPINE BESYLATE 2.5 MG/1
1 TABLET ORAL
Qty: 30 | Refills: 0
Start: 2021-04-02 | End: 2021-05-01

## 2021-04-02 RX ADMIN — LISINOPRIL 10 MILLIGRAM(S): 2.5 TABLET ORAL at 05:20

## 2021-04-02 RX ADMIN — AMLODIPINE BESYLATE 5 MILLIGRAM(S): 2.5 TABLET ORAL at 05:19

## 2021-04-02 NOTE — ED ADULT NURSE REASSESSMENT NOTE - NS ED NURSE REASSESS COMMENT FT1
Pt given phone back fully charged. Pt resting comfortably in stretcher @ this time. Pt offers no complaints, denies dizziness, SOB, chest pain. Pt remains on telebox in 40s, asymptomatic. Respirations even & unlabored. NAD present. SHAMIKA results available.

## 2021-04-02 NOTE — PROGRESS NOTE ADULT - SUBJECTIVE AND OBJECTIVE BOX
[place] [facility]                                                               Office: [address] [address]                                                              Telephone: [phone]. Fax:[phone]                                                                             PROGRESS NOTE  Reason for follow up:   Overnight: No new events.   Update:     Subjective: "  ______________________"      Review of symptoms:   Cardiac:  No chest pain. No dyspnea. No palpitations.  Respiratory:no cough. No dyspnea  Gastrointestinal: No diarrhea. No abdominal pain. No bleeding.   Neuro: No focal neuro complaints.      Vitals:  T(C): 36.6 (04-02-21 @ 07:55), Max: 36.9 (04-01-21 @ 11:51)  HR: 40 (04-02-21 @ 07:55) (40 - 47)  BP: 128/71 (04-02-21 @ 07:55) (121/66 - 207/87)  RR: 16 (04-02-21 @ 07:55) (16 - 20)  SpO2: 97% (04-02-21 @ 07:55) (95% - 99%)  Wt(kg): --  I&O's Summary    Weight (kg): 104.3 (04-01 @ 11:51)      PHYSICAL EXAM:  Appearance: Comfortable. No acute distress  HEENT:  Atraumatic. Normocephalic.  Normal oral mucosa, PERRL, Neck is supple. No carotid bruit.   Neurologic: A & O x 3, no focal deficits. EOMI.  Cardiovascular: Normal S1 S2, No murmur, rubs/gallops. No JVD, No edema  Respiratory: Lungs clear to auscultation, unlabored   Gastrointestinal:  Soft, Non-tender, + BS  Lower Extremities: No edema  Psychiatry: Patient is calm. No agitation. Mood & affect appropriate  Skin: No rashes/ ecchymoses/cyanosis/ulcers visualized on the face, hands or feet.      CURRENT MEDICATIONS:  amLODIPine   Tablet 5 milliGRAM(s) Oral daily  hydrochlorothiazide 12.5 milliGRAM(s) Oral daily  lisinopril 10 milliGRAM(s) Oral daily        DIAGNOSTIC TESTING:  [ ] Echocardiogram:   [ ]  Catheterization:  [ ] Stress Test:    OTHER: 	      LABS:	 	  CARDIAC MARKERS ( 01 Apr 2021 18:44 )  x     / <0.01 ng/mL / x     / x     / x      p-BNP 01 Apr 2021 18:44: x    , CARDIAC MARKERS ( 01 Apr 2021 11:53 )  x     / <0.01 ng/mL / x     / x     / x      p-BNP 01 Apr 2021 11:53: 38 pg/mL                          14.6   4.00  )-----------( 176      ( 01 Apr 2021 11:53 )             43.0     04-01    138  |  102  |  17.0  ----------------------------<  92  3.8   |  25.0  |  0.90    Ca    9.8      01 Apr 2021 11:53  Mg     1.9     04-01    TPro  7.6  /  Alb  4.7  /  TBili  0.5  /  DBili  x   /  AST  30  /  ALT  33  /  AlkPhos  48  04-01    proBNP: Serum Pro-Brain Natriuretic Peptide: 38 pg/mL (04-01 @ 11:53)    Lipid Profile:   HgA1c:   TSH: Thyroid Stimulating Hormone, Serum: 1.66 uIU/mL        TELEMETRY: Reviewed    ECG:  Reviewed by me. 	                                                                                [facility]                                                                      [facility]                                                               Office: [address] [address]                                                              Telephone: [phone]. Fax:[phone]                                                                             PROGRESS NOTE  Reason for follow up: hypertension, bradycardia  Overnight: No new events.   Update: NO events overnight. Pt states feeling better. Denies headache, dizziness, HTN controlled. Tele- SR, SB, junctional. EP to evaluate for further intervention      Review of symptoms:   Cardiac:  No chest pain. No dyspnea. No palpitations.  Respiratory: no cough. No dyspnea  Gastrointestinal: No diarrhea. No abdominal pain. No bleeding.   Neuro: No focal neuro complaints.      Vitals:  T(C): 36.6 (04-02-21 @ 07:55), Max: 36.9 (04-01-21 @ 11:51)  HR: 40 (04-02-21 @ 07:55) (40 - 47)  BP: 128/71 (04-02-21 @ 07:55) (121/66 - 207/87)  RR: 16 (04-02-21 @ 07:55) (16 - 20)  SpO2: 97% (04-02-21 @ 07:55) (95% - 99%)      Weight (kg): 104.3 (04-01 @ 11:51)      PHYSICAL EXAM:  Appearance: Comfortable. No acute distress  HEENT:  Atraumatic. Normocephalic.  Normal oral mucosa, PERRL, Neck is supple. No carotid bruit.   Neurologic: A & O x 3, no focal deficits. EOMI.  Cardiovascular: Normal S1 S2, No murmur, rubs/gallops. No JVD, No edema  Respiratory: Lungs clear to auscultation, unlabored   Gastrointestinal:  Soft, Non-tender, + BS  Lower Extremities: No edema  Psychiatry: Patient is calm. No agitation. Mood & affect appropriate  Skin: No rashes/ ecchymoses/cyanosis/ulcers visualized on the face, hands or feet.      CURRENT MEDICATIONS:  amLODIPine   Tablet 5 milliGRAM(s) Oral daily  hydrochlorothiazide 12.5 milliGRAM(s) Oral daily  lisinopril 10 milliGRAM(s) Oral daily      DIAGNOSTIC TESTING:  [ ] Echocardiogram:   < from: TTE Echo Complete w/ Contrast w/ Doppler (04.01.21 @ 15:02) >    LV Wall Scoring:  The entire septum is hypokinetic. All remaining scored segments are normal.    Right Ventricle: The right ventricular size is mildly enlarged. RV systolic function is mildly reduced.  Left Atrium: Mildly enlarged left atrium.  Right Atrium: The right atrium is normal in size.  Pericardium: There is no evidence of pericardial effusion.  Mitral Valve: Mild thickening of the anterior mitral valve leaflet. Mitral leaflet mobility is normal. Peak transmitral mean gradient equals 1.0 mmHg, calculated mitral valve area by pressure half time equals 2.71 cm² consistent with No evidence of mitral stenosis. No evidence of mitral stenosis. Mitral valve mean gradient is 1.0 mmHg consistent with normal mitral stenosis. Mild mitral valve regurgitation is seen. The MR jet is centrally-directed. Mild PMVL mitral valve prolapse present.  Tricuspid Valve: Trivial tricuspid regurgitation is visualized.  Aortic Valve: The aortic valve is trileaflet. No evidence of aortic stenosis. Sclerotic aortic valve with normal opening. Peak transaortic gradient equals 6.8 mmHg, mean transaortic gradient equals 3.0 mmHg, the calculated aortic valve area equals 4.24 cm² by the continuity equation consistent with normally opening aortic valve. No evidence of aortic valve regurgitation is seen.  Pulmonic Valve: The pulmonic valve is normal. Trace pulmonic valve regurgitation.  Aorta: The aortic root and ascending aorta are structurally normal, with no evidence of dilitation.  Pulmonary Artery: The main pulmonary artery is normal in size.  Venous: The pulmonary veins appear normal. The inferior vena cava is normal. The inferior vena cava was normal sized, with respiratory size variation greater than 50%.      Summary:   1. Left ventricular ejection fraction, by visual estimation, is 65 to 70%.   2. Normal global left ventricular systolic function.   3. Entire septum is abnormal as described above.   4. LV Ejection Fraction by Villar's Method with a biplane EF of 67 %.   5. Spectral Doppler shows impaired relaxation pattern of left ventricular myocardial filling (Grade I diastolic dysfunction).   6. Mildly enlarged right ventricle.   7. Mildly reduced RV systolic function.   8. Mildly enlarged left atrium.   9. Mild mitral valve regurgitation.  10. Mild thickening of the anterior mitral valve leaflet.  11. Mild PMVL mitral valve prolapse present.  12. Sclerotic aortic valve with normal opening.  13. Mitral valve mean gradient is 1.0 mmHg consistent with normal mitral stenosis.  14. No evidence of mitral stenosis.    MD Aneudy Electronically signed on 4/1/2021 at 10:11:58 PM    < end of copied text >    [ ]  CT:  < from: CT Angio Cardiac w/ IV Cont (04.01.21 @ 14:59) >  CORONARY:  -DOMINANCE: right  -LEFT MAIN CORONARY ARTERY: Patent with mild atherosclerosis with noncalcified plaque  -ANTERIOR DESCENDING ARTERY:       -PROX:  Patent       -MID:  Patent       -DISTAL:  Probably patent with motion artifact  -CIRCUMFLEX ARTERY: small territory       -PROX:  Patent       -MID:  Patent       -DISTAL:  not visualized  -RIGHT CORONARY ARTERY:     -PROX:  Patent       -MID:  Not well visualized due to motion artifact       -DISTAL:  Patent    Myocardial Function:  The left ventricle is of normal size and function. Cine display demonstrates no regional wall motion abnormality. LVEDV= 153.7 cc; LVESV= 58.7 cc. Calculated ejection fraction is 62%.    PERICARDIUM/CARDIAC STRUCTURES:  normal  Aortic valve: trileaflets. Aortic root measured at the sinus of Valsalva 3.1 cm, ascending aorta 3.0 cm x 2.9 cm, descending aorta 2.3 cm x 2.3 cm  Main pulmonary artery 2.8 cm, no evidence of central pulmonary emboli.  Mitral valve: Normal bileaflets.  Pulmonary venous return: Normal, all 4 veins drain into the left atrium, no evidence of left atrial appendage thrombus.    CALCIUM SCORE (A.U.)    Segment                                Score  --------------------------------------------------  Left Main                                0  Left anterior Descending     0  Circumflex                              0  Right        0  --------------------------------------------------  Total                                      0    Age/Sex Adjusted Percentile Rating (Raggi, Circulation 2000):   No identifiable calcification, at less than 25 percentile, at less than 35 years for coronary age.    IMPRESSION:  CT coronary angiography shows: Probably normal study limited by motion artifact, CAD RADS 0, no clear evidence of stenosis.  LMCA: Patent with mild atherosclerosis with noncalcified plaque  LAD: Widely patent limited by distal segment motion artifact.   LCx: small vessel, widely patent.  RCA: Dominant, widely patent except for mid segment not well visualized due to motion artifact.  Left ventricular systolic function and wall motion: Normal    ****Please notethat this report is not complete unless radiologist addendum appears at the top of the page.    < end of copied text >  	    LABS:	 	  CARDIAC MARKERS ( 01 Apr 2021 18:44 )  x     / <0.01 ng/mL / x     / x     / x      p-BNP 01 Apr 2021 18:44: x    , CARDIAC MARKERS ( 01 Apr 2021 11:53 )  x     / <0.01 ng/mL / x     / x     / x      p-BNP 01 Apr 2021 11:53: 38 pg/mL                          14.6   4.00  )-----------( 176      ( 01 Apr 2021 11:53 )             43.0     04-01    138  |  102  |  17.0  ----------------------------<  92  3.8   |  25.0  |  0.90    Ca    9.8      01 Apr 2021 11:53  Mg     1.9     04-01    TPro  7.6  /  Alb  4.7  /  TBili  0.5  /  DBili  x   /  AST  30  /  ALT  33  /  AlkPhos  48  04-01    proBNP: Serum Pro-Brain Natriuretic Peptide: 38 pg/mL (04-01 @ 11:53)    Lipid Profile:   HgA1c:   TSH: Thyroid Stimulating Hormone, Serum: 1.66 uIU/mL        TELEMETRY: Reviewed    ECG:  Reviewed by me.

## 2021-04-02 NOTE — ED CDU PROVIDER SUBSEQUENT DAY NOTE - HISTORY
Pt resting comfortably at time of re-assessment. No events overnight. HR sustained 40-41 bpm on tele. Will continue to monitor.

## 2021-04-02 NOTE — CONSULT NOTE ADULT - ATTENDING COMMENTS
Pt is seen, examined, chart reviewed, d/w np/pa.  Management as outlined above.  Bradycardia:  Frequent junctional beats with SB in the 40s. No s/s of syncope or near syncope.  Likely exacerbated by clonidine.   Obtain CCTA, Check TSH and Lyme titres.   Avoid AV nodals.   Monitor on telemetry overnight.   HTN:   Uncontrolled.  Add norvasc 10mg PO qday.  Can give IV hydralazine if needed for SBP continued >160.   Continue lisinopril.
60 year old male with history of malignancy (Dermatofibrosarcoma s/p excision/chemo/radiation) in remission and HTN who was sent from his PCP office for HTNsive urgency. He was noted to have sinus bradycardia with periods of sinus arrest and junctional escape. In discussion with him and his girlfriend, he has had more fatigue recently. He was asked to exercise at his bedside and his HR only augmented to ~70 bpm although he was symptomatic. He notes that his brother required pacemaker implantation ~age 62 as well (he is 10 years older). He otherwise has not had any syncope or other red flag symptoms. He was reportedly given 1 dose of clonidine but otherwise has not been on any AVN blockers.    He most likely has sinus node dysfunction and will require a permanent pacemaker. We discussed sinus node dysfunction and implications of pacemaker implantation. He is hesitant to proceed at this time and he has not displayed any red flag symptoms. We will have him wear a 48 HR holter monitor (to be picked up from office today) with follow up on Tuesday. Depending on Holter findings, we will make a decision regarding pacemaker implantation. He was instructed on symptoms to be aware of which would require prompt evaluation in ER.    Recommendations:  - Avoid AVN blockers  - Treat hypertension  - Outpatient 48 HR Holter  - OK for discharge with outpatient follow up    Paulo Murphy MD  Clinical Cardiac Electrophysiology

## 2021-04-02 NOTE — ED ADULT NURSE REASSESSMENT NOTE - NS ED NURSE REASSESS COMMENT FT1
Pt received @ 0730, A&OX3, denies any pain/discomfort.  VSS, except for HR in 40's at thi stime.  As per telebox records, pt has been dropping down to 30's HR intermittently, Mary Cardio PA and cardio attending also aware at this time. Pt received @ 0730, A&OX3, denies any pain/discomfort.  VSS, except for HR in 40's at thi stime.  As per telebox records, pt has been dropping down to 30's HR intermittently during the early morning today, Mary CALDERON and Dr. Murphy also aware at this time.  Pt denies any episodes of dizziness/sob/cp/lightheadedness during the night/at this time.  Will continue to monitor.

## 2021-04-02 NOTE — ED CDU PROVIDER SUBSEQUENT DAY NOTE - PHYSICAL EXAMINATION
Gen: No acute distress, non toxic  HEENT: Mucous membranes moist, pink conjunctivae, EOMI  CV: Bradycardic, nl s1/s2.  Resp: CTAB, normal rate and effort  GI: Abdomen soft, NT, ND. No rebound, no guarding  Neuro: A&O x 3, moving all 4 extremities  MSK: No spine or joint tenderness to palpation  Skin: No rashes. intact and perfused.

## 2021-04-02 NOTE — CONSULT NOTE ADULT - SUBJECTIVE AND OBJECTIVE BOX
[place] [facility]  [facility]   Office: [address] [address]  Telephone: [phone] Fax:[phone]      HPI:  59 y/o M with a PMHx of Dermatofibrosarcoma s/p excision/chemo/radiation (last treatment ~ 1 year ago) and recently diagnosed with HTN who was sent from his PCP office for bradycardia and hypertension. Patient saw his PMD a few weeks ago and was noted to be bradycardic and HTN and started on lisionpril/hctz. He followed up yesterday for re-evaluation and again was noted to be hypertensive and bradycardic so PMD transferred to ED. Pt was given PO clonidine prior to transfer. In ED patient found to have a SBP ~170 and HR ~40bpm. Pt reported experiencing intermittent chest pain x couple weeks. He described pain as substernal, non-radiating and self resolving. Pt admitted to observation, HTN treated with lisionpril/hctz, and norvasc. Labs wnl, troponin x 2 negative,  TSH wnl, lymes pending, CXR wnl, and CTA coronary wnl.  Pt has seen cardiology in the past (Dr Crane > 1 year ago for cardiac clearance) He recalls having an echo and stress test. He was told "everything was normal". He does not recall ever having a holter monitor.     Pt reports having Covid in January and since then has had intermittent dizziness, low energy and is easily fatigued. Denies syncope or presyncope.     Off note, Patient with pertinent family history of bother with PPM at age 60. Denies FH of SCD or CAD     Cardiologist: Dr Thurston    Tele: Sinus bradycardia with  intermittent sinus pauses and narrow junctional escape   EKG: Sinus bradycardia with prolonged AV delay ([id number]), sinus pause with narrow junctional escape     PAST MEDICAL & SURGICAL HISTORY:   Dermatofibrosarcoma  Testicular abnormality  has only one testicle s/p injury 25 yrs ago  BPH (benign prostatic hyperplasia)  Other benign neoplasm of skin of unspecified lower limb, including hip  Obesity (BMI 30.0-34.9)  Fracture of hand  pins present in right hand.  S/P hardware removal  2015    REVIEW OF SYSTEMS:  CONSTITUTIONAL: No fever, weight loss, + fatigue  NECK: No pain or stiffness  RESPIRATORY: No cough, wheezing, chills or hemoptysis; No shortness of breath  CARDIOVASCULAR: see HPI  GASTROINTESTINAL: No abdominal or epigastric pain. No nausea, vomiting, or hematemesis; No diarrhea or constipation. No melena or hematochezia.  NEUROLOGICAL: No headaches, memory loss, loss of strength, numbness, or tremors   ENDOCRINE: No heat or cold intolerance; No hair loss  HEME/LYMPH: No easy bruising, or bleeding gums      MEDICATIONS  (STANDING):  amLODIPine   Tablet 5 milliGRAM(s) Oral daily  hydrochlorothiazide 12.5 milliGRAM(s) Oral daily  lisinopril 10 milliGRAM(s) Oral daily    Allergies  No Known Allergies    SOCIAL HISTORY:  Ex-smoker  +ETOH: 2 drinks weekly  Denies drug use      FAMILY HISTORY:  Brother +PPM  Denies SCD or CAD      Vital Signs Last 24 Hrs  T(C): 36.6 (02 Apr 2021 07:55), Max: 36.9 (01 Apr 2021 11:51)  T(F): 97.8 (02 Apr 2021 07:55), Max: 98.5 (01 Apr 2021 11:51)  HR: 40 (02 Apr 2021 07:55) (40 - 47)  BP: 128/71 (02 Apr 2021 07:55) (121/66 - 207/87)  RR: 16 (02 Apr 2021 07:55) (16 - 20)  SpO2: 97% (02 Apr 2021 07:55) (95% - 99%)    Physical Exam:  Constitutional: AAOx3, NAD  Neck: supple, No JVD  Cardiovascular: +S1S2 RRR, no murmurs, rubs, gallops   Pulmonary: CTA b/l, unlabored, no wheezes, rales. rhonci  Abdomen: +BS, soft NTND  Extremities: no edema b/l, +distal pulses b/l  Neuro: non focal, speech clear, MANLEY x 4    LABS:                        14.6   4.00  )-----------( 176      ( 01 Apr 2021 11:53 )             43.0   04-01    138  |  102  |  17.0  ----------------------------<  92  3.8   |  25.0  |  0.90    Ca    9.8      01 Apr 2021 11:53  Mg     1.9     04-01    TPro  7.6  /  Alb  4.7  /  TBili  0.5  /  DBili  x   /  AST  30  /  ALT  33  /  AlkPhos  48  04-01  LIVER FUNCTIONS - ( 01 Apr 2021 11:53 )  Alb: 4.7 g/dL / Pro: 7.6 g/dL / ALK PHOS: 48 U/L / ALT: 33 U/L / AST: 30 U/L / GGT: x           PT/INR - ( 01 Apr 2021 11:53 )   PT: 12.3 sec;   INR: 1.06 ratio         PTT - ( 01 Apr 2021 11:53 )  PTT:34.7 secCARDIAC MARKERS ( 01 Apr 2021 18:44 )  x     / <0.01 ng/mL / x     / x     / x      CARDIAC MARKERS ( 01 Apr 2021 11:53 )  x     / <0.01 ng/mL / x     / x     / x              RADIOLOGY & ADDITIONAL STUDIES:  TTE 4/1/2021  Summary:   1. Left ventricular ejection fraction, by visual estimation, is 65 to 70%.   2. Normal global left ventricular systolic function.   3. Entire septum is abnormal as described above.   4. LV Ejection Fraction by Villar's Method with a biplane EF of 67 %.   5. Spectral Doppler shows impaired relaxation pattern of left ventricular myocardial filling (Grade I diastolic dysfunction).   6. Mildly enlarged right ventricle.   7. Mildly reduced RV systolic function.   8. Mildly enlarged left atrium.   9. Mild mitral valve regurgitation.  10. Mild thickening of the anterior mitral valve leaflet.  11. Mild PMVL mitral valve prolapse present.  12. Sclerotic aortic valve with normal opening.  13. Mitral valve mean gradient is 1.0 mmHg consistent with normal mitral stenosis.  14. No evidence of mitral stenosis.    MD Aneudy Electronically signed on 4/1/2021 at 10:11:58 PM    CTA cardiac 4/1/2021  IMPRESSION:  CT coronary angiography shows: Probably normal study limited by motion artifact, CAD RADS 0, no clear evidence of stenosis.  LMCA: Patent with mild atherosclerosis with noncalcified plaque  LAD: Widely patent limited by distal segment motion artifact.  LCx: small vessel, widely patent.  RCA: Dominant, widely patent except for mid segment not well visualized due to motion artifact.  Left ventricular systolic function and wall motion: Normal    NTERPRETATION:  DATE OF STUDY: 4/1/21    PRIOR:12/14/19 CT scan of chest.    CLINICAL INDICATION: Bradycardia.    TECHNIQUE: portable chest.    FINDINGS:  The heart is magnified by technique.  Pulmonary vascularity appears normal.  No focal consolidation. No pleural effusion or pneumothorax.  Degenerative changes of the thoracic spine.    IMPRESSION:  Negative for acute pulmonary process.       [place] CARDIAC ELECTROPHYSIOLOGY  [facility]   Office: [address] [address]  Telephone: [phone] Fax:[phone]      HPI:  59 y/o M with a PMHx of Dermatofibrosarcoma s/p excision/chemo/radiation (last treatment ~ 1 year ago) and recently diagnosed with HTN who was sent from his PCP office for bradycardia and hypertension. Patient saw his PMD a few weeks ago and was noted to be bradycardic and HTN and started on lisionpril/hctz. He followed up yesterday for re-evaluation and again was noted to be hypertensive and bradycardic so PMD transferred to ED. Pt was given PO clonidine prior to transfer. In ED patient found to have a SBP ~170 and HR ~40bpm with c/o intermittent chest pain x couple weeks. He described pain as substernal, non-radiating and self resolving. Pt was admitted to observation for continued telemetry monitoring. HTN treated with lisionpril/hctz, and norvasc. Labs wnl, troponin x 2 negative, TSH wnl, lymes pending, CXR wnl, and CTA coronary wnl.  Pt does report seening cardiology in the past (Dr Crane > 1 year ago for cardiac clearance). He recalls having an echo and stress test. He was told "everything was normal". He does not recall ever having a holter monitor.     Pt reports having Covid in January and since then has had intermittent dizziness, low energy and is easily fatigued. Denies syncope or presyncope.     Off note, Patient with pertinent family history of bother with PPM at age 60. Denies FH of SCD or CAD     Cardiologist: Dr Thurston    Tele: Sinus bradycardia with intermittent sinus pauses and narrow junctional escape.   EKG: Sinus bradycardia with prolonged AV delay ([id number]), sinus pause with narrow junctional escape     PAST MEDICAL & SURGICAL HISTORY:  Dermatofibrosarcoma  Testicular abnormality  has only one testicle s/p injury 25 yrs ago  BPH (benign prostatic hyperplasia)  Other benign neoplasm of skin of unspecified lower limb, including hip  Obesity (BMI 30.0-34.9)  Fracture of hand  pins present in right hand.  S/P hardware removal  2015    REVIEW OF SYSTEMS:  CONSTITUTIONAL: No fever, weight loss, + fatigue  NECK: No pain or stiffness  RESPIRATORY: No cough, wheezing, chills or hemoptysis; No shortness of breath  CARDIOVASCULAR: see HPI  GASTROINTESTINAL: No abdominal or epigastric pain. No nausea, vomiting, or hematemesis; No diarrhea or constipation. No melena or hematochezia.  NEUROLOGICAL: No headaches, memory loss, loss of strength, numbness, or tremors   ENDOCRINE: No heat or cold intolerance; No hair loss  HEME/LYMPH: No easy bruising, or bleeding gums      MEDICATIONS  (STANDING):  amLODIPine   Tablet 5 milliGRAM(s) Oral daily  hydrochlorothiazide 12.5 milliGRAM(s) Oral daily  lisinopril 10 milliGRAM(s) Oral daily    Allergies  No Known Allergies    SOCIAL HISTORY:  Ex-smoker  +ETOH: 2 drinks weekly  Denies drug use      FAMILY HISTORY:  Brother +PPM  Denies SCD or CAD      Vital Signs Last 24 Hrs  T(C): 36.6 (02 Apr 2021 07:55), Max: 36.9 (01 Apr 2021 11:51)  T(F): 97.8 (02 Apr 2021 07:55), Max: 98.5 (01 Apr 2021 11:51)  HR: 40 (02 Apr 2021 07:55) (40 - 47)  BP: 128/71 (02 Apr 2021 07:55) (121/66 - 207/87)  RR: 16 (02 Apr 2021 07:55) (16 - 20)  SpO2: 97% (02 Apr 2021 07:55) (95% - 99%)    Physical Exam:  Constitutional: AAOx3, NAD  Neck: supple, No JVD  Cardiovascular: +S1S2 bradycardic, no murmurs, rubs, gallops. Pt exercised with minimal elevations in HR to ~65bpm    Pulmonary: CTA b/l, unlabored, no wheezes, rales. rhonci  Abdomen: +BS, soft NTND  Extremities: no edema b/l, +distal pulses b/l  Neuro: non focal, speech clear, MANLEY x 4    LABS:                        14.6   4.00  )-----------( 176      ( 01 Apr 2021 11:53 )             43.0   04-01    138  |  102  |  17.0  ----------------------------<  92  3.8   |  25.0  |  0.90    Ca    9.8      01 Apr 2021 11:53  Mg     1.9     04-01    TPro  7.6  /  Alb  4.7  /  TBili  0.5  /  DBili  x   /  AST  30  /  ALT  33  /  AlkPhos  48  04-01  LIVER FUNCTIONS - ( 01 Apr 2021 11:53 )  Alb: 4.7 g/dL / Pro: 7.6 g/dL / ALK PHOS: 48 U/L / ALT: 33 U/L / AST: 30 U/L / GGT: x           PT/INR - ( 01 Apr 2021 11:53 )   PT: 12.3 sec;   INR: 1.06 ratio         PTT - ( 01 Apr 2021 11:53 )  PTT:34.7 secCARDIAC MARKERS ( 01 Apr 2021 18:44 )  x     / <0.01 ng/mL / x     / x     / x      CARDIAC MARKERS ( 01 Apr 2021 11:53 )  x     / <0.01 ng/mL / x     / x     / x          RADIOLOGY & ADDITIONAL STUDIES:  TTE 4/1/2021  Summary:   1. Left ventricular ejection fraction, by visual estimation, is 65 to 70%.   2. Normal global left ventricular systolic function.   3. Entire septum is abnormal as described above.   4. LV Ejection Fraction by Villar's Method with a biplane EF of 67 %.   5. Spectral Doppler shows impaired relaxation pattern of left ventricular myocardial filling (Grade I diastolic dysfunction).   6. Mildly enlarged right ventricle.   7. Mildly reduced RV systolic function.   8. Mildly enlarged left atrium.   9. Mild mitral valve regurgitation.  10. Mild thickening of the anterior mitral valve leaflet.  11. Mild PMVL mitral valve prolapse present.  12. Sclerotic aortic valve with normal opening.  13. Mitral valve mean gradient is 1.0 mmHg consistent with normal mitral stenosis.  14. No evidence of mitral stenosis.    MD Aneudy Electronically signed on 4/1/2021 at 10:11:58 PM    CTA cardiac 4/1/2021  IMPRESSION:  CT coronary angiography shows: Probably normal study limited by motion artifact, CAD RADS 0, no clear evidence of stenosis.  LMCA: Patent with mild atherosclerosis with noncalcified plaque  LAD: Widely patent limited by distal segment motion artifact.  LCx: small vessel, widely patent.  RCA: Dominant, widely patent except for mid segment not well visualized due to motion artifact.  Left ventricular systolic function and wall motion: Normal    NTERPRETATION:  DATE OF STUDY: 4/1/21    PRIOR:12/14/19 CT scan of chest.    CLINICAL INDICATION: Bradycardia.    TECHNIQUE: portable chest.    FINDINGS:  The heart is magnified by technique.  Pulmonary vascularity appears normal.  No focal consolidation. No pleural effusion or pneumothorax.  Degenerative changes of the thoracic spine.    IMPRESSION:  Negative for acute pulmonary process.       [place] [facility]ARDIAC ELECTROPHYSIOLOGY  [facility]   Office: [address] [address]  Telephone: [phone] Fax:[phone]      HPI:  61 y/o M with a PMHx of Dermatofibrosarcoma s/p excision/chemo/radiation (last treatment ~ 1 year ago) and recently diagnosed with HTN who was sent from his PCP office for bradycardia and hypertension. Patient saw his PMD a few weeks ago and was noted to be bradycardic and HTN and started on lisionpril/hctz. He followed up yesterday for re-evaluation and again was noted to be hypertensive and bradycardic so PMD transferred to ED. Pt was given PO clonidine prior to transfer. In ED patient found to have a SBP ~170 and HR ~40bpm with c/o intermittent chest pain x couple weeks. He described pain as substernal, non-radiating and self resolving. Pt was admitted to observation for continued telemetry monitoring. HTN treated with lisionpril/hctz, and norvasc. Labs wnl, troponin x 2 negative, TSH wnl, lymes pending, CXR wnl, and CTA coronary wnl.  Pt does report seening cardiology in the past (Dr Crane > 1 year ago for cardiac clearance). He recalls having an echo and stress test. He was told "everything was normal". He does not recall ever having a holter monitor.     Pt reports having Covid in January and since then has had intermittent dizziness, low energy and is easily fatigued. Denies syncope or presyncope.     Off note, Patient with pertinent family history of bother with PPM at age 60. Denies FH of SCD or CAD     Cardiologist: Dr Thurston    Tele: Sinus rhythm, sinus bradycardia with intermittent sinus pauses and narrow junctional escape.   EKG: Sinus bradycardia with prolonged AV delay (UM150hm), sinus pause with narrow junctional escape     PAST MEDICAL & SURGICAL HISTORY:  Dermatofibrosarcoma  Testicular abnormality  has only one testicle s/p injury 25 yrs ago  BPH (benign prostatic hyperplasia)  Other benign neoplasm of skin of unspecified lower limb, including hip  Obesity (BMI 30.0-34.9)  Fracture of hand  pins present in right hand.  S/P hardware removal  2015    REVIEW OF SYSTEMS:  CONSTITUTIONAL: No fever, weight loss, + fatigue  NECK: No pain or stiffness  RESPIRATORY: No cough, wheezing, chills or hemoptysis; No shortness of breath  CARDIOVASCULAR: see HPI  GASTROINTESTINAL: No abdominal or epigastric pain. No nausea, vomiting, or hematemesis; No diarrhea or constipation. No melena or hematochezia.  NEUROLOGICAL: No headaches, memory loss, loss of strength, numbness, or tremors   ENDOCRINE: No heat or cold intolerance; No hair loss  HEME/LYMPH: No easy bruising, or bleeding gums      MEDICATIONS  (STANDING):  amLODIPine   Tablet 5 milliGRAM(s) Oral daily  hydrochlorothiazide 12.5 milliGRAM(s) Oral daily  lisinopril 10 milliGRAM(s) Oral daily    Allergies  No Known Allergies    SOCIAL HISTORY:  Ex-smoker  +ETOH: 2 drinks weekly  Denies drug use      FAMILY HISTORY:  Brother +PPM  Denies SCD or CAD      Vital Signs Last 24 Hrs  T(C): 36.6 (02 Apr 2021 07:55), Max: 36.9 (01 Apr 2021 11:51)  T(F): 97.8 (02 Apr 2021 07:55), Max: 98.5 (01 Apr 2021 11:51)  HR: 40 (02 Apr 2021 07:55) (40 - 47)  BP: 128/71 (02 Apr 2021 07:55) (121/66 - 207/87)  RR: 16 (02 Apr 2021 07:55) (16 - 20)  SpO2: 97% (02 Apr 2021 07:55) (95% - 99%)    Physical Exam:  Constitutional: AAOx3, NAD  Neck: supple, No JVD  Cardiovascular: +S1S2 bradycardic, no murmurs, rubs, gallops. Pt exercised with minimal elevations in HR to ~65bpm    Pulmonary: CTA b/l, unlabored, no wheezes, rales. rhonchi  Abdomen: +BS, soft NTND  Extremities: no edema b/l, +distal pulses b/l  Neuro: non focal, speech clear, MANLEY x 4    LABS:                        14.6   4.00  )-----------( 176      ( 01 Apr 2021 11:53 )             43.0   04-01    138  |  102  |  17.0  ----------------------------<  92  3.8   |  25.0  |  0.90    Ca    9.8      01 Apr 2021 11:53  Mg     1.9     04-01    TPro  7.6  /  Alb  4.7  /  TBili  0.5  /  DBili  x   /  AST  30  /  ALT  33  /  AlkPhos  48  04-01  LIVER FUNCTIONS - ( 01 Apr 2021 11:53 )  Alb: 4.7 g/dL / Pro: 7.6 g/dL / ALK PHOS: 48 U/L / ALT: 33 U/L / AST: 30 U/L / GGT: x           PT/INR - ( 01 Apr 2021 11:53 )   PT: 12.3 sec;   INR: 1.06 ratio         PTT - ( 01 Apr 2021 11:53 )  PTT:34.7 secCARDIAC MARKERS ( 01 Apr 2021 18:44 )  x     / <0.01 ng/mL / x     / x     / x      CARDIAC MARKERS ( 01 Apr 2021 11:53 )  x     / <0.01 ng/mL / x     / x     / x          RADIOLOGY & ADDITIONAL STUDIES:  TTE 4/1/2021  Summary:   1. Left ventricular ejection fraction, by visual estimation, is 65 to 70%.   2. Normal global left ventricular systolic function.   3. Entire septum is abnormal as described above.   4. LV Ejection Fraction by Villar's Method with a biplane EF of 67 %.   5. Spectral Doppler shows impaired relaxation pattern of left ventricular myocardial filling (Grade I diastolic dysfunction).   6. Mildly enlarged right ventricle.   7. Mildly reduced RV systolic function.   8. Mildly enlarged left atrium.   9. Mild mitral valve regurgitation.  10. Mild thickening of the anterior mitral valve leaflet.  11. Mild PMVL mitral valve prolapse present.  12. Sclerotic aortic valve with normal opening.  13. Mitral valve mean gradient is 1.0 mmHg consistent with normal mitral stenosis.  14. No evidence of mitral stenosis.    MD Aneudy Electronically signed on 4/1/2021 at 10:11:58 PM    CTA cardiac 4/1/2021  IMPRESSION:  CT coronary angiography shows: Probably normal study limited by motion artifact, CAD RADS 0, no clear evidence of stenosis.  LMCA: Patent with mild atherosclerosis with noncalcified plaque  LAD: Widely patent limited by distal segment motion artifact.  LCx: small vessel, widely patent.  RCA: Dominant, widely patent except for mid segment not well visualized due to motion artifact.  Left ventricular systolic function and wall motion: Normal    NTERPRETATION:  DATE OF STUDY: 4/1/21    PRIOR:12/14/19 CT scan of chest.    CLINICAL INDICATION: Bradycardia.    TECHNIQUE: portable chest.    FINDINGS:  The heart is magnified by technique.  Pulmonary vascularity appears normal.  No focal consolidation. No pleural effusion or pneumothorax.  Degenerative changes of the thoracic spine.    IMPRESSION:  Negative for acute pulmonary process.       [place] CARDIAC ELECTROPHYSIOLOGY  [facility]   Office: [address] [address]  Telephone: [phone] Fax:[phone]      HPI:  59 y/o M with a PMHx of Dermatofibrosarcoma s/p excision/chemo/radiation (last treatment ~ 1 year ago) and recently diagnosed with HTN who was sent from his PCP office for bradycardia and hypertension. Patient saw his PMD a few weeks ago and was noted to be bradycardic and HTN and started on lisionpril/hctz. He followed up yesterday for re-evaluation and again was noted to be hypertensive and bradycardic so PMD transferred to ED. Pt was given PO clonidine prior to transfer. In ED patient found to have a SBP ~170 and HR ~40bpm with c/o intermittent chest pain x couple weeks. He described pain as substernal, non-radiating and self resolving. Pt was admitted to observation for continued telemetry monitoring. HTN treated with lisionpril/hctz, and norvasc. Labs wnl, troponin x 2 negative, TSH wnl, lymes pending, CXR wnl, and CTA coronary wnl.  Pt does report seening cardiology in the past (Dr Crane > 1 year ago for cardiac clearance). He recalls having an echo and stress test. He was told "everything was normal". He does not recall ever having a holter monitor.     Pt reports having Covid in January and since then has had intermittent dizziness, low energy and is easily fatigued. Denies syncope or presyncope. Denies fevers, chills, palpitations, abdominal pain, N/V/D, or  weakness.    Off note, Patient with pertinent family history of bother with PPM at age 60. Denies FH of SCD or CAD     Cardiologist: Dr Thurston    Tele: Sinus rhythm, sinus bradycardia with intermittent sinus pauses and narrow junctional escape.   EKG: Sinus bradycardia with prolonged AV delay ([id number]), sinus pause with narrow junctional escape     PAST MEDICAL & SURGICAL HISTORY:  Dermatofibrosarcoma  Testicular abnormality  has only one testicle s/p injury 25 yrs ago  BPH (benign prostatic hyperplasia)  Other benign neoplasm of skin of unspecified lower limb, including hip  Obesity (BMI 30.0-34.9)  Fracture of hand  pins present in right hand.  S/P hardware removal  2015    REVIEW OF SYSTEMS:  CONSTITUTIONAL: No fever, weight loss, + fatigue  NECK: No pain or stiffness  RESPIRATORY: No cough, wheezing, chills or hemoptysis; No shortness of breath  CARDIOVASCULAR: see HPI  GASTROINTESTINAL: No abdominal or epigastric pain. No nausea, vomiting, or hematemesis; No diarrhea or constipation. No melena or hematochezia.  NEUROLOGICAL: No headaches, memory loss, loss of strength, numbness, or tremors   ENDOCRINE: No heat or cold intolerance; No hair loss  HEME/LYMPH: No easy bruising, or bleeding gums      MEDICATIONS  (STANDING):  amLODIPine   Tablet 5 milliGRAM(s) Oral daily  hydrochlorothiazide 12.5 milliGRAM(s) Oral daily  lisinopril 10 milliGRAM(s) Oral daily    Allergies  No Known Allergies    SOCIAL HISTORY:  Ex-smoker  +ETOH: 2 drinks weekly  Denies drug use      FAMILY HISTORY:  Brother +PPM  Denies SCD or CAD      Vital Signs Last 24 Hrs  T(C): 36.6 (02 Apr 2021 07:55), Max: 36.9 (01 Apr 2021 11:51)  T(F): 97.8 (02 Apr 2021 07:55), Max: 98.5 (01 Apr 2021 11:51)  HR: 40 (02 Apr 2021 07:55) (40 - 47)  BP: 128/71 (02 Apr 2021 07:55) (121/66 - 207/87)  RR: 16 (02 Apr 2021 07:55) (16 - 20)  SpO2: 97% (02 Apr 2021 07:55) (95% - 99%)    Physical Exam:  Constitutional: AAOx3, NAD  Neck: supple, No JVD  Cardiovascular: +S1S2 bradycardic, no murmurs, rubs, gallops. Pt exercised with minimal elevations in HR to ~65bpm    Pulmonary: CTA b/l, unlabored, no wheezes, rales. rhonchi  Abdomen: +BS, soft NTND  Extremities: no edema b/l, +distal pulses b/l  Neuro: non focal, speech clear, MANLEY x 4    LABS:                        14.6   4.00  )-----------( 176      ( 01 Apr 2021 11:53 )             43.0   04-01    138  |  102  |  17.0  ----------------------------<  92  3.8   |  25.0  |  0.90    Ca    9.8      01 Apr 2021 11:53  Mg     1.9     04-01    TPro  7.6  /  Alb  4.7  /  TBili  0.5  /  DBili  x   /  AST  30  /  ALT  33  /  AlkPhos  48  04-01  LIVER FUNCTIONS - ( 01 Apr 2021 11:53 )  Alb: 4.7 g/dL / Pro: 7.6 g/dL / ALK PHOS: 48 U/L / ALT: 33 U/L / AST: 30 U/L / GGT: x           PT/INR - ( 01 Apr 2021 11:53 )   PT: 12.3 sec;   INR: 1.06 ratio         PTT - ( 01 Apr 2021 11:53 )  PTT:34.7 secCARDIAC MARKERS ( 01 Apr 2021 18:44 )  x     / <0.01 ng/mL / x     / x     / x      CARDIAC MARKERS ( 01 Apr 2021 11:53 )  x     / <0.01 ng/mL / x     / x     / x          RADIOLOGY & ADDITIONAL STUDIES:  TTE 4/1/2021  Summary:   1. Left ventricular ejection fraction, by visual estimation, is 65 to 70%.   2. Normal global left ventricular systolic function.   3. Entire septum is abnormal as described above.   4. LV Ejection Fraction by Villar's Method with a biplane EF of 67 %.   5. Spectral Doppler shows impaired relaxation pattern of left ventricular myocardial filling (Grade I diastolic dysfunction).   6. Mildly enlarged right ventricle.   7. Mildly reduced RV systolic function.   8. Mildly enlarged left atrium.   9. Mild mitral valve regurgitation.  10. Mild thickening of the anterior mitral valve leaflet.  11. Mild PMVL mitral valve prolapse present.  12. Sclerotic aortic valve with normal opening.  13. Mitral valve mean gradient is 1.0 mmHg consistent with normal mitral stenosis.  14. No evidence of mitral stenosis.    MD Aneudy Electronically signed on 4/1/2021 at 10:11:58 PM    CTA cardiac 4/1/2021  IMPRESSION:  CT coronary angiography shows: Probably normal study limited by motion artifact, CAD RADS 0, no clear evidence of stenosis.  LMCA: Patent with mild atherosclerosis with noncalcified plaque  LAD: Widely patent limited by distal segment motion artifact.  LCx: small vessel, widely patent.  RCA: Dominant, widely patent except for mid segment not well visualized due to motion artifact.  Left ventricular systolic function and wall motion: Normal    NTERPRETATION:  DATE OF STUDY: 4/1/21    PRIOR:12/14/19 CT scan of chest.    CLINICAL INDICATION: Bradycardia.    TECHNIQUE: portable chest.    FINDINGS:  The heart is magnified by technique.  Pulmonary vascularity appears normal.  No focal consolidation. No pleural effusion or pneumothorax.  Degenerative changes of the thoracic spine.    IMPRESSION:  Negative for acute pulmonary process.       [place] [facility]  [facility]   Office: [address] [address]  Telephone: [phone] Fax:[phone]    HPI:  59 y/o M with a PMHx of Dermatofibrosarcoma s/p excision/chemo/radiation (last treatment ~ 1 year ago) and recently diagnosed with HTN who was sent from his PCP office for bradycardia and hypertension. Patient saw his PMD a few weeks ago and was noted to be bradycardic and HTN and started on lisionpril/hctz. He followed up yesterday for re-evaluation and again was noted to be hypertensive and bradycardic so PMD transferred to ED. Pt was given PO clonidine prior to transfer. In ED patient found to have a SBP ~170 and HR ~40bpm with c/o intermittent chest pain x couple weeks. He described pain as substernal, non-radiating and self resolving. Pt was admitted to observation for continued telemetry monitoring. HTN treated with lisionpril/hctz, and norvasc. Labs wnl, troponin x 2 negative, TSH wnl, lymes pending, CXR wnl, and CTA coronary wnl.  Pt does report seening cardiology in the past (Dr Crane > 1 year ago for cardiac clearance). He recalls having an echo and stress test. He was told "everything was normal". He does not recall ever having a holter monitor.     Pt reports having Covid in January and since then has had intermittent dizziness, low energy and is easily fatigued. Denies syncope or presyncope. Denies fevers, chills, palpitations, abdominal pain, N/V/D, or  weakness.    Off note, Patient with pertinent family history of bother with PPM at age 60. Denies FH of SCD or CAD     Cardiologist: Dr Thurston    Tele: Sinus rhythm, sinus bradycardia with intermittent sinus pauses and narrow junctional escape.   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  S/P hardware removal  2015    REVIEW OF SYSTEMS:  CONSTITUTIONAL: No fever, weight loss, + fatigue  NECK: No pain or stiffness  RESPIRATORY: No cough, wheezing, chills or hemoptysis; No shortness of breath  CARDIOVASCULAR: see HPI  GASTROINTESTINAL: No abdominal or epigastric pain. No nausea, vomiting, or hematemesis; No diarrhea or constipation. No melena or hematochezia.  NEUROLOGICAL: No headaches, memory loss, loss of strength, numbness, or tremors   ENDOCRINE: No heat or cold intolerance; No hair loss  HEME/LYMPH: No easy bruising, or bleeding gums      MEDICATIONS  (STANDING):  amLODIPine   Tablet 5 milliGRAM(s) Oral daily  hydrochlorothiazide 12.5 milliGRAM(s) Oral daily  lisinopril 10 milliGRAM(s) Oral daily    Allergies  No Known Allergies    SOCIAL HISTORY:  Ex-smoker  +ETOH: 2 drinks weekly  Denies drug use      FAMILY HISTORY:  Brother +PPM  Denies SCD or CAD      Vital Signs Last 24 Hrs  T(C): 36.6 (02 Apr 2021 07:55), Max: 36.9 (01 Apr 2021 11:51)  T(F): 97.8 (02 Apr 2021 07:55), Max: 98.5 (01 Apr 2021 11:51)  HR: 40 (02 Apr 2021 07:55) (40 - 47)  BP: 128/71 (02 Apr 2021 07:55) (121/66 - 207/87)  RR: 16 (02 Apr 2021 07:55) (16 - 20)  SpO2: 97% (02 Apr 2021 07:55) (95% - 99%)    Physical Exam:  Constitutional: AAOx3, NAD  Neck: supple, No JVD  Cardiovascular: +S1S2 bradycardic, no murmurs, rubs, gallops. Pt exercised with minimal elevations in HR to ~65bpm    Pulmonary: CTA b/l, unlabored, no wheezes, rales. rhonchi  Abdomen: +BS, soft NTND  Extremities: no edema b/l, +distal pulses b/l  Neuro: non focal, speech clear, MANLEY x 4    LABS:                        14.6   4.00  )-----------( 176      ( 01 Apr 2021 11:53 )             43.0   04-01    138  |  102  |  17.0  ----------------------------<  92  3.8   |  25.0  |  0.90    Ca    9.8      01 Apr 2021 11:53  Mg     1.9     04-01    TPro  7.6  /  Alb  4.7  /  TBili  0.5  /  DBili  x   /  AST  30  /  ALT  33  /  AlkPhos  48  04-01  LIVER FUNCTIONS - ( 01 Apr 2021 11:53 )  Alb: 4.7 g/dL / Pro: 7.6 g/dL / ALK PHOS: 48 U/L / ALT: 33 U/L / AST: 30 U/L / GGT: x           PT/INR - ( 01 Apr 2021 11:53 )   PT: 12.3 sec;   INR: 1.06 ratio         PTT - ( 01 Apr 2021 11:53 )  PTT:34.7 secCARDIAC MARKERS ( 01 Apr 2021 18:44 )  x     / <0.01 ng/mL / x     / x     / x      CARDIAC MARKERS ( 01 Apr 2021 11:53 )  x     / <0.01 ng/mL / x     / x     / x          RADIOLOGY & ADDITIONAL STUDIES:  TTE 4/1/2021  Summary:   1. Left ventricular ejection fraction, by visual estimation, is 65 to 70%.   2. Normal global left ventricular systolic function.   3. Entire septum is abnormal as described above.   4. LV Ejection Fraction by Villar's Method with a biplane EF of 67 %.   5. Spectral Doppler shows impaired relaxation pattern of left ventricular myocardial filling (Grade I diastolic dysfunction).   6. Mildly enlarged right ventricle.   7. Mildly reduced RV systolic function.   8. Mildly enlarged left atrium.   9. Mild mitral valve regurgitation.  10. Mild thickening of the anterior mitral valve leaflet.  11. Mild PMVL mitral valve prolapse present.  12. Sclerotic aortic valve with normal opening.  13. Mitral valve mean gradient is 1.0 mmHg consistent with normal mitral stenosis.  14. No evidence of mitral stenosis.    MD Aneudy Electronically signed on 4/1/2021 at 10:11:58 PM    [facility]A cardiac 4/1/2021  IMPRESSION:  CT coronary angiography shows: Probably normal study limited by motion artifact, CAD RADS 0, no clear evidence of stenosis.  LMCA: Patent with mild atherosclerosis with noncalcified plaque  LAD: Widely patent limited by distal segment motion artifact.  LCx: small vessel, widely patent.  RCA: Dominant, widely patent except for mid segment not well visualized due to motion artifact.  Left ventricular systolic function and wall motion: Normal    NTERPRETATION:  DATE OF STUDY: 4/1/21    PRIOR:12/14/19 CT scan of chest.    CLINICAL INDICATION: Bradycardia.    TECHNIQUE: portable chest.    FINDINGS:  The heart is magnified by technique.  Pulmonary vascularity appears normal.  No focal consolidation. No pleural effusion or pneumothorax.  Degenerative changes of the thoracic spine.    IMPRESSION:  Negative for acute pulmonary process.

## 2021-04-02 NOTE — ED ADULT NURSE REASSESSMENT NOTE - COMFORT CARE
plan of care explained/wait time explained
ambulated to bathroom/meal provided/plan of care explained/po fluids offered/repositioned/wait time explained

## 2021-04-02 NOTE — ED CDU PROVIDER SUBSEQUENT DAY NOTE - ATTENDING CONTRIBUTION TO CARE
I, Marc Rubio, have personally performed a face to face diagnostic evaluation on this patient. I have reviewed the ACP note and agree with the history, exam and plan of care, except as noted.    59 yo M place in Obs for bradycardia. meds adjusted by cards. CTA coronary negative. no chest pain, no sob. no dizziness. well appearing. patient seen by cards this morning, pending EP consult.

## 2021-04-02 NOTE — ED CDU PROVIDER DISPOSITION NOTE - PATIENT PORTAL LINK FT
You can access the FollowMyHealth Patient Portal offered by [facility] by registering at the following website: [email or web address]. By joining [facility]’s FollowMyHealth portal, you will also be able to view your health information using other applications (apps) compatible with our system.

## 2021-04-02 NOTE — CONSULT NOTE ADULT - ASSESSMENT
61 y/o M with a PMHx of Dermatofibrosarcoma s/p excision/chemo/radiation (last treatment ~ 1 year ago) and recently diagnosed with HTN who was sent from his PCP office for bradycardia and hypertension. Patient saw his PMD a few weeks ago and was noted to be bradycardic and HTN and started on lisionpril/hctz. He followed up yesterday for re-evaluation and again was noted to be hypertensive and bradycardic so PMD transferred to ED. Pt was given PO clonidine prior to transfer. In ED patient found to have a SBP ~170 and HR ~40bpm with c/o intermittent chest pain x couple weeks. He described pain as substernal, non-radiating and self resolving. Pt was admitted to observation for continued telemetry monitoring. HTN treated with lisionpril/hctz, and norvasc. Labs wnl, troponin x 2 negative, TSH wnl, lymes pending, CXR wnl, and CTA coronary wnl.  Pt does report seening cardiology in the past (Dr Crane > 1 year ago for cardiac clearance). He recalls having an echo and stress test. He was told "everything was normal". He does not recall ever having a holter monitor.     Pt reports having Covid in January and since then has had intermittent dizziness, low energy and is easily fatigued. Denies syncope or presyncope.     Off note, Patient with pertinent family history of bother with PPM at age 60. Denies FH of SCD or CAD     Chronotropic incompetence  SND    Recommendations:   - Patient with chronotropic incompetence and SND, however, hesitant with proceeding with PPM at this time. Pt without history of syncope and has a reliable narrow junctional escape.   -OK to discharge home for EP standpoint with outpt f/u with Dr Murphy in 2 weeks  -MCOT for 1-2 weeks to be arranged by office staff prior to visit    -Full recommendations to follow  61 y/o M with a PMHx of Dermatofibrosarcoma s/p excision/chemo/radiation (last treatment ~ 1 year ago) and recently diagnosed with HTN who was sent from his PCP office for bradycardia and hypertension. Patient saw his PMD a few weeks ago and was noted to be bradycardic and HTN and started on lisionpril/hctz. He followed up yesterday for re-evaluation and again was noted to be hypertensive and bradycardic so PMD transferred to ED. Pt was given PO clonidine prior to transfer. In ED patient found to have a SBP ~170 and HR ~40bpm with c/o intermittent chest pain x couple weeks. He described pain as substernal, non-radiating and self resolving. Pt was admitted to observation for continued telemetry monitoring. HTN treated with lisionpril/hctz, and norvasc. Labs wnl, troponin x 2 negative, TSH wnl, lymes pending, CXR wnl, and CTA coronary wnl.  Pt does report seening cardiology in the past (Dr Crane > 1 year ago for cardiac clearance). He recalls having an echo and stress test. He was told "everything was normal". He does not recall ever having a holter monitor.     Pt reports having Covid in January and since then has had intermittent dizziness, low energy and is easily fatigued. Denies syncope or presyncope.     Off note, Patient with pertinent family history of bother with PPM at age 60. Denies FH of SCD or CAD     Chronotropic incompetence  SND    Recommendations:   - Patient with chronotropic incompetence and SND, however, hesitant with proceeding with PPM at this time. Pt without history of syncope and has a reliable narrow junctional escape.   -OK to discharge home for EP standpoint with outpt f/u with Dr Murphy in 2 weeks  -Avoid AVN blocking agents  -MCOT for 1-2 weeks to be arranged by office staff prior to visit    -Full recommendations to follow  59 y/o M with a PMHx of Dermatofibrosarcoma s/p excision/chemo/radiation (last treatment ~ 1 year ago) and recently diagnosed with HTN who was sent from his PCP office for bradycardia and hypertension. Patient saw his PMD a few weeks ago and was noted to be bradycardic and HTN and started on lisionpril/hctz. He followed up yesterday for re-evaluation and again was noted to be hypertensive and bradycardic so PMD transferred to ED. Pt was given PO clonidine prior to transfer. In ED patient found to have a SBP ~170 and HR ~40bpm with c/o intermittent chest pain x couple weeks. He described pain as substernal, non-radiating and self resolving. Pt was admitted to observation for continued telemetry monitoring. HTN treated with lisionpril/hctz, and norvasc. Labs wnl, troponin x 2 negative, TSH wnl, lymes pending, CXR wnl, and CTA coronary wnl.  Pt does report seening cardiology in the past (Dr Crane > 1 year ago for cardiac clearance). He recalls having an echo and stress test. He was told "everything was normal". He does not recall ever having a holter monitor.     Pt reports having Covid in January and since then has had intermittent dizziness, low energy and is easily fatigued. Denies syncope or presyncope. Denies fevers, chills, palpitations, abdominal pain, N/V/D, or  weakness.    Off note, Patient with pertinent family history of bother with PPM at age 60. Denies FH of SCD or CAD     Chronotropic incompetence  SND    Recommendations:   - Patient with chronotropic incompetence and SND, however, hesitant with proceeding with PPM at this time. Pt without history of syncope and has a reliable narrow junctional escape.   -OK to discharge home for EP standpoint with outpt f/u with Dr Murphy in 2 weeks  -Avoid AVN blocking agents  -MCOT for 1-2 weeks to be arranged by office staff prior to visit    -Full recommendations to follow  59 y/o M with a PMHx of Dermatofibrosarcoma s/p excision/chemo/radiation (last treatment ~ 1 year ago) and recently diagnosed with HTN who was sent from his PCP office for bradycardia and hypertension. Patient saw his PMD a few weeks ago and was noted to be bradycardic and HTN and started on lisionpril/hctz. He followed up yesterday for re-evaluation and again was noted to be hypertensive and bradycardic so PMD transferred to ED. Pt was given PO clonidine prior to transfer. In ED patient found to have a SBP ~170 and HR ~40bpm with c/o intermittent chest pain x couple weeks. He described pain as substernal, non-radiating and self resolving. Pt was admitted to observation for continued telemetry monitoring. HTN treated with lisionpril/hctz, and norvasc. Labs wnl, troponin x 2 negative, TSH wnl, lymes pending, CXR wnl, and CTA coronary wnl.  Pt does report seening cardiology in the past (Dr Crane > 1 year ago for cardiac clearance). He recalls having an echo and stress test. He was told "everything was normal". He does not recall ever having a holter monitor.     Pt reports having Covid in January and since then has had intermittent dizziness, low energy and is easily fatigued. Denies syncope or presyncope. Denies fevers, chills, palpitations, abdominal pain, N/V/D, or  weakness.    Off note, Patient with pertinent family history of bother with PPM at age 60. Denies FH of SCD or CAD     Chronotropic incompetence  SND    Recommendations:   - Patient with chronotropic incompetence and SND, however, hesitant with proceeding with PPM at this time. Pt without history of syncope and has a reliable narrow junctional escape.   -OK to discharge home for EP standpoint with outpt f/u with Dr Murphy in 2 weeks  -Avoid AVN blocking agents  - 48 HR Holter to be picked up at outpatient [facility] ([address])    -Full recommendations to follow

## 2021-04-02 NOTE — ED CDU PROVIDER DISPOSITION NOTE - CARE PROVIDER_API CALL
Paulo Murphy)  Cardiology; Internal Medicine  [address] [place] [address], [place] [address]  Phone: [phone]  Fax: [phone]  Follow Up Time:

## 2021-04-02 NOTE — ED CDU PROVIDER DISPOSITION NOTE - CLINICAL COURSE
Pt presenting to the ED for asymptomatic bradycardia with junctional escape rhythm. Labs unremarkable, cardio recs ctca and echo. All results reviewed and pt seen by EPS. The recommend f/u with Dr. Murphy as outpt. Will f/u with current cardiologist and add norvasc 10 mg qd.

## 2021-04-02 NOTE — ED CDU PROVIDER DISPOSITION NOTE - ATTENDING CONTRIBUTION TO CARE
I, Marc Rubio, have personally performed a face to face diagnostic evaluation on this patient. I have reviewed the ACP note and agree with the history, exam and plan of care, except as noted.    61 yo M place in Obs for bradycardia. meds adjusted by cards. CTA coronary negative. trop negative. cxr clear.  no chest pain, no sob. no dizziness. well appearing. patient seen by cards this morning, cleared for discharge with medication readjustment. patient seen by EP, can follow up outpatient with Dr. Murphy.

## 2021-04-02 NOTE — PROGRESS NOTE ADULT - ASSESSMENT
EP to evaluate  CCTA normal, no calcium      full note to follow 59 y/o M with a PMHx of HTN (on lisinopril) who was sent from his PCP office for bradycardia and hypertension.     CT coronary angiography shows: Probably normal study limited by motion artifact, CAD RADS 0, no clear evidence of stenosis.  LMCA: Patent with mild atherosclerosis with noncalcified plaque  LAD: Widely patent limited by distal segment motion artifact.   LCx: small vessel, widely patent.  RCA: Dominant, widely patent except for mid segment not well visualized due to motion artifact.  Left ventricular systolic function and wall motion: Normal    Bradycardia  - Junctional beats with SB in the 30s.   - No s/s of syncope or near syncope.   - Cardiac CTA - mild atherosclerosis LMCA, Lcx small vessel. calcium score 0.   - TSH normal, lyme pending  - Avoid AV nodals.   - EP consulted - to follow up as out patient  - no further cardiac testing/intervention at this time, follow up with out patient cardiology     HTN  - cont norvasc 10mg PO qday.   - Continue lisinopril.     Thank you for allowing me to participate in care of your patient.

## 2021-04-02 NOTE — ED CDU PROVIDER DISPOSITION NOTE - NSFOLLOWUPINSTRUCTIONS_ED_ALL_ED_FT
Follow up with electrophysiology wihtin   Come back with new or worsening symptoms.  Take medication as prescribed. Follow up with electrophysiology within 2-4 weeks.   Follow up with cardiology in 2-4 weeks.   Come back with new or worsening symptoms.  Take medication as prescribed.

## 2021-04-05 PROBLEM — Z78.9 DOES NOT USE ILLICIT DRUGS: Status: ACTIVE | Noted: 2021-04-05

## 2021-04-05 PROBLEM — Z78.9 SOCIAL ALCOHOL USE: Status: ACTIVE | Noted: 2021-04-05

## 2021-04-05 PROBLEM — Z82.49 FAMILY HISTORY OF CARDIAC PACEMAKER: Status: ACTIVE | Noted: 2021-04-05

## 2021-04-06 ENCOUNTER — APPOINTMENT (OUTPATIENT)
Dept: ELECTROPHYSIOLOGY | Facility: CLINIC | Age: 61
End: 2021-04-06
Payer: COMMERCIAL

## 2021-04-06 ENCOUNTER — NON-APPOINTMENT (OUTPATIENT)
Age: 61
End: 2021-04-06

## 2021-04-06 VITALS
TEMPERATURE: 96.9 F | SYSTOLIC BLOOD PRESSURE: 140 MMHG | BODY MASS INDEX: 33.62 KG/M2 | DIASTOLIC BLOOD PRESSURE: 80 MMHG | HEIGHT: 69 IN | HEART RATE: 58 BPM | WEIGHT: 227 LBS | OXYGEN SATURATION: 98 %

## 2021-04-06 DIAGNOSIS — Z82.49 FAMILY HISTORY OF ISCHEMIC HEART DISEASE AND OTHER DISEASES OF THE CIRCULATORY SYSTEM: ICD-10-CM

## 2021-04-06 DIAGNOSIS — Z78.9 OTHER SPECIFIED HEALTH STATUS: ICD-10-CM

## 2021-04-06 DIAGNOSIS — R00.1 BRADYCARDIA, UNSPECIFIED: ICD-10-CM

## 2021-04-06 PROCEDURE — 93000 ELECTROCARDIOGRAM COMPLETE: CPT | Mod: 59

## 2021-04-06 PROCEDURE — 99072 ADDL SUPL MATRL&STAF TM PHE: CPT

## 2021-04-06 PROCEDURE — 99214 OFFICE O/P EST MOD 30 MIN: CPT | Mod: 25

## 2021-04-06 RX ORDER — LISINOPRIL AND HYDROCHLOROTHIAZIDE TABLETS 10; 12.5 MG/1; MG/1
10-12.5 TABLET ORAL DAILY
Qty: 30 | Refills: 1 | Status: DISCONTINUED | COMMUNITY
Start: 2021-04-05 | End: 2021-04-06

## 2021-04-06 NOTE — DISCUSSION/SUMMARY
[FreeTextEntry1] : LARISA GUPTA is a 60 year old male with malignancy (Dermatofibrosarcoma s/p excision, chemo, radiation) in remission and HTN who presents for follow up of bradycardia after recent hospitalization.\par \par We had a thorough discussion regarding the diagnosis of sinus node dysfunction. I informed the patient that sinus node dysfunction is a slowly progressive disease and that treatment for sinus node dysfunction is geared towards symptomatic improvement. Although he has bradycardia, his symptoms to be significantly improved. His 48 HR Holter shows predominantly bradycardia but at times his rates rise over 120 bpm. We will obtain an exercise treadmill test to ensure he has adequate chronotropic competence. If it is normal, we can follow up in 6 months to see if he has any change in symptoms. It is likely that at some point in the future he would benefit with pacemaker implantation.\par \par In regards to hypertension, he was supposed to be discharged on Amlodipine in addition to his Lisinopril-HCTZ. However, he only took Amlodipine daily. I requested he restart Lisinopril-HCTZ as well.\par \par Recommendations:\par - Exercise Treadmill Test\par - Resume Lisinopril-HCTZ in addition to Amlodipine 10mg daily\par - Check BP twice a day and follow up with PCP\par \par RTC in 6 months.\par \par Paulo Murphy MD, FACC, RS\par Clinical Cardiac Electrophysiology

## 2021-04-06 NOTE — PHYSICAL EXAM
[General Appearance - Well Developed] : well developed [Normal Appearance] : normal appearance [General Appearance - Well Nourished] : well nourished [Respiration, Rhythm And Depth] : normal respiratory rhythm and effort [Exaggerated Use Of Accessory Muscles For Inspiration] : no accessory muscle use [Abnormal Walk] : normal gait [Skin Color & Pigmentation] : normal skin color and pigmentation [Skin Turgor] : normal skin turgor [] : no rash [Oriented To Time, Place, And Person] : oriented to person, place, and time [Impaired Insight] : insight and judgment were intact [No Anxiety] : not feeling anxious

## 2021-04-06 NOTE — REASON FOR VISIT
[Consultation] : a consultation regarding [FreeTextEntry1] : Cardiologist: Dr. Agustin Thurston [Other: _____] : [unfilled]

## 2021-04-06 NOTE — HISTORY OF PRESENT ILLNESS
[FreeTextEntry1] : LARISA GUPTA is a 60 year old male with malignancy (Dermatofibrosarcoma s/p excision/chemo/radiation) in remission and HTN who presents for follow up of bradycardia after recent hospitalization.\par \par To summarize his history, he did not follow doctors for a few years and recently presented to a new PCP about 1 month ago and was found to have hypertension. He was started on Lisinopril/HCTZ and had follow up with PCP in 2 weeks. In 2 weeks he was noted to have persistent hypertension and bradycardia. He followed up again with PCP 1 week later and was again noted to be bradycardic and hypertensive with SBP in 200s so was sent to ER. He was reportedly given 1 dose of clonidine in the office. At [facility] ER he was continued on his anti-hypertensives and amlodipine was added. He was found to have bradycardia with occasional sinus arrest and junctional escape. With exercise at the bedside his HR only increased to about 70 bpm. His girlfriend noted that he has had increasing fatigue recently. He was discharged with a 48 HR Holter.\par \par Today, he states that he feels better. He states he no longer has symptoms of feeling dyspnea, fatigue or tiredness. He now exercises daily with running and walking for about 1.5-2 hours. He denies having any near syncope or syncope.

## 2021-05-25 ENCOUNTER — APPOINTMENT (OUTPATIENT)
Dept: CARDIOLOGY | Facility: CLINIC | Age: 61
End: 2021-05-25
Payer: COMMERCIAL

## 2021-05-25 PROCEDURE — 93015 CV STRESS TEST SUPVJ I&R: CPT

## 2021-06-03 ENCOUNTER — NON-APPOINTMENT (OUTPATIENT)
Age: 61
End: 2021-06-03

## 2021-10-19 ENCOUNTER — APPOINTMENT (OUTPATIENT)
Dept: ELECTROPHYSIOLOGY | Facility: CLINIC | Age: 61
End: 2021-10-19
Payer: MEDICAID

## 2021-10-19 VITALS — DIASTOLIC BLOOD PRESSURE: 80 MMHG | SYSTOLIC BLOOD PRESSURE: 140 MMHG

## 2021-10-19 VITALS
DIASTOLIC BLOOD PRESSURE: 100 MMHG | RESPIRATION RATE: 17 BRPM | HEIGHT: 69 IN | OXYGEN SATURATION: 97 % | TEMPERATURE: 98 F | BODY MASS INDEX: 33.47 KG/M2 | WEIGHT: 226 LBS | HEART RATE: 47 BPM | SYSTOLIC BLOOD PRESSURE: 171 MMHG

## 2021-10-19 DIAGNOSIS — R00.1 BRADYCARDIA, UNSPECIFIED: ICD-10-CM

## 2021-10-19 PROCEDURE — 93000 ELECTROCARDIOGRAM COMPLETE: CPT

## 2021-10-19 PROCEDURE — 99214 OFFICE O/P EST MOD 30 MIN: CPT

## 2021-10-19 RX ORDER — LISINOPRIL AND HYDROCHLOROTHIAZIDE TABLETS 20; 25 MG/1; MG/1
20-25 TABLET ORAL DAILY
Refills: 0 | Status: ACTIVE | COMMUNITY
Start: 2021-04-06

## 2021-10-19 NOTE — DISCUSSION/SUMMARY
[FreeTextEntry1] : LARISA GUPTA is a 61 year old male with malignancy (Dermatofibrosarcoma s/p excision/chemo/radiation) in remission and HTN who presents for follow up.\par \par He is doing well without any significant complaints. He has asymptomatic bradycardia. We reviewed red flag symptoms to be aware of that should prompt cardiology evaluation. He will continue follow up with Dr. Mackenzie for HTN and see me if needed.\par \par Recommendations:\par - Continue treatment for hypertension\par - Follow up if patient develops symptoms with bradycardia\par \par Follow up with PCP, cardiology/EP as needed.\par \par Paulo Murphy MD, FACC, FHRS\par Clinical Cardiac Electrophysiology

## 2021-10-19 NOTE — PHYSICAL EXAM
[Well Developed] : well developed [Well Nourished] : well nourished [No Acute Distress] : no acute distress [No Respiratory Distress] : no respiratory distress  [Normal Gait] : normal gait [Moves all extremities] : moves all extremities [No Focal Deficits] : no focal deficits [Alert and Oriented] : alert and oriented [Normal memory] : normal memory

## 2021-10-19 NOTE — CARDIOLOGY SUMMARY
[de-identified] : \par 10/19/2021: Sinus bradycardia with first degree AV block.\par 04/06/2021: Sinus bradycardia with first degree AV block.\par  [de-identified] : \par 4/2-4/5/21 48 HR Holter: Sinus rhythm average HR 55 bpm ( bpm). 3 SVT runs, longest 4 beats. No AF. Sinus rates up to ~130 bpm correlating to a period of activity.\par  [de-identified] : \par 05/25/2021 ETT: 10 min 30 seconds duration. Peak  bpm (96% of MPHR). 12 METS. ST depressions seen during exercise and 1 minute into recovery.\par  [de-identified] : \par 04/01/2021 TTE: LVEF: 65-70%. Mildly enlarged RV with mildly reduced RV function. Mildly enlarged LA (LAd 5.23 cm, KALYN 43.5 mL/m2). Mild MR. Mild mitral valve prolapse.\par  [de-identified] : \par 04/01/2021 CCTA (at [facility]): Patent coronary arteries. Calcium score of 0.\par

## 2021-10-19 NOTE — REASON FOR VISIT
[FreeTextEntry1] : PCP: Dr. Komal Riggs ([place])\par Cardiologist: Dr. Agustin Thurston (no longer sees)

## 2021-10-19 NOTE — HISTORY OF PRESENT ILLNESS
[FreeTextEntry1] : LARISA GUPTA is a 61 year old male with malignancy (Dermatofibrosarcoma s/p excision/chemo/radiation) in remission and HTN who presents for follow up.\par \par To summarize his history, he did not follow doctors for a few years and presented to a new PCP around March of 2021 was found to have hypertension. He was started on Lisinopril/HCTZ and had follow up with PCP in 2 weeks. In 2 weeks he was noted to have persistent hypertension and bradycardia. He followed up again with PCP 1 week later and was again noted to be bradycardic and hypertensive with SBP in 200s so was sent to ER. He was reportedly given 1 dose of clonidine in the office. At [facility] ER he was continued on his anti-hypertensives and amlodipine was added. He was found to have bradycardia with occasional sinus arrest and junctional escape. With exercise at the bedside his HR only increased to about 70 bpm. His girlfriend noted that he has had increasing fatigue recently. He was discharged with a 48 HR Holter which revealed average HR of 55 bpm with rates ranging from  bpm during periods of physical activity.\par \par Today, he states he feels well. He denies having any dyspnea, fatigue, chest pain/pressure, dizziness, lightheadedness, or syncope. He only complains of left arm pain due to arthritis.

## 2023-05-08 ENCOUNTER — APPOINTMENT (OUTPATIENT)
Dept: PULMONOLOGY | Facility: CLINIC | Age: 63
End: 2023-05-08
Payer: MEDICAID

## 2023-05-08 VITALS
SYSTOLIC BLOOD PRESSURE: 130 MMHG | WEIGHT: 215 LBS | BODY MASS INDEX: 31.84 KG/M2 | OXYGEN SATURATION: 98 % | HEART RATE: 48 BPM | RESPIRATION RATE: 16 BRPM | DIASTOLIC BLOOD PRESSURE: 80 MMHG | HEIGHT: 69 IN

## 2023-05-08 DIAGNOSIS — I10 ESSENTIAL (PRIMARY) HYPERTENSION: ICD-10-CM

## 2023-05-08 DIAGNOSIS — C49.21 MALIGNANT NEOPLASM OF CONNECTIVE AND SOFT TISSUE OF RIGHT LOWER LIMB, INCLUDING HIP: ICD-10-CM

## 2023-05-08 PROCEDURE — 99204 OFFICE O/P NEW MOD 45 MIN: CPT | Mod: 25

## 2023-05-08 PROCEDURE — G0296 VISIT TO DETERM LDCT ELIG: CPT

## 2023-05-08 RX ORDER — SILDENAFIL 20 MG/1
20 TABLET ORAL
Refills: 0 | Status: DISCONTINUED | COMMUNITY
End: 2023-05-08

## 2023-05-08 RX ORDER — TERAZOSIN 1 MG/1
1 CAPSULE ORAL TWICE DAILY
Refills: 0 | Status: DISCONTINUED | COMMUNITY
End: 2023-05-08

## 2023-05-08 RX ORDER — AMLODIPINE BESYLATE 10 MG/1
10 TABLET ORAL DAILY
Refills: 0 | Status: DISCONTINUED | COMMUNITY
End: 2023-05-08

## 2023-05-08 NOTE — HISTORY OF PRESENT ILLNESS
[Current] : current [< 20 pack-years] : < 20 pack-years [TextBox_4] : C/O snoring that very much bothers his wife. C/O EDS even though ESS only 6, nonrestorative sleep. Dry mouth. \par \par To bed: 11:30-12 mn\par Latency: watches TV in bed, 1/2 hour\par OOB: 8am\par \par Works as cook. \par \par Started smoking at age 50. Still smokes occasionally. Had quit on an off the past few years. \par \par No sob, wheeze, cough. [ESS] : 6 [TextBox_11] : 7

## 2023-06-22 ENCOUNTER — APPOINTMENT (OUTPATIENT)
Dept: PULMONOLOGY | Facility: CLINIC | Age: 63
End: 2023-06-22

## 2023-07-25 NOTE — ASU PATIENT PROFILE, ADULT - NPO AFTER
Documentation of vitals, telemetry, exercise, and nurses notes completed in [facility] system.      23:00

## 2023-10-16 ENCOUNTER — APPOINTMENT (OUTPATIENT)
Dept: PULMONOLOGY | Facility: CLINIC | Age: 63
End: 2023-10-16
Payer: MEDICAID

## 2023-10-16 VITALS
SYSTOLIC BLOOD PRESSURE: 134 MMHG | HEART RATE: 46 BPM | DIASTOLIC BLOOD PRESSURE: 82 MMHG | BODY MASS INDEX: 32.88 KG/M2 | OXYGEN SATURATION: 98 % | RESPIRATION RATE: 16 BRPM | HEIGHT: 69 IN | WEIGHT: 222 LBS

## 2023-10-16 DIAGNOSIS — E66.9 OBESITY, UNSPECIFIED: ICD-10-CM

## 2023-10-16 DIAGNOSIS — G47.33 OBSTRUCTIVE SLEEP APNEA (ADULT) (PEDIATRIC): ICD-10-CM

## 2023-10-16 DIAGNOSIS — Z87.891 PERSONAL HISTORY OF NICOTINE DEPENDENCE: ICD-10-CM

## 2023-10-16 DIAGNOSIS — F17.200 NICOTINE DEPENDENCE, UNSPECIFIED, UNCOMPLICATED: ICD-10-CM

## 2023-10-16 PROCEDURE — 99214 OFFICE O/P EST MOD 30 MIN: CPT

## 2023-12-15 NOTE — ED ADULT NURSE REASSESSMENT NOTE - SYMPTOMS
Health Maintenance Due   Topic Date Due    COVID-19 Vaccine (1) Never done    Pneumococcal Vaccine 0-64 (1 of 2 - PCV) Never done    Influenza Vaccine (1) 09/01/2023       Patient is due for topics as listed above but is not proceeding with Immunization(s) COVID-19, Influenza, and Pneumococcal at this time.   
none
none

## 2024-01-06 NOTE — H&P PST ADULT - PROBLEM SELECTOR PLAN 1
yes
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 Pre op instructions, famotidine, chlorhexidine gluconate soap given and explained. Pt verbalized understanding.  Pending medical evaluation

## 2024-06-27 ENCOUNTER — INPATIENT (INPATIENT)
Facility: HOSPITAL | Age: 64
LOS: 1 days | Discharge: HOME | DRG: 379 | End: 2024-06-29
Attending: STUDENT IN AN ORGANIZED HEALTH CARE EDUCATION/TRAINING PROGRAM | Admitting: STUDENT IN AN ORGANIZED HEALTH CARE EDUCATION/TRAINING PROGRAM
Payer: COMMERCIAL

## 2024-06-27 ENCOUNTER — TRANSCRIPTION ENCOUNTER (OUTPATIENT)
Age: 64
End: 2024-06-27

## 2024-06-27 ENCOUNTER — RESULT REVIEW (OUTPATIENT)
Age: 64
End: 2024-06-27

## 2024-06-27 VITALS
RESPIRATION RATE: 18 BRPM | WEIGHT: 201.94 LBS | DIASTOLIC BLOOD PRESSURE: 83 MMHG | SYSTOLIC BLOOD PRESSURE: 141 MMHG | HEART RATE: 45 BPM | TEMPERATURE: 98 F | OXYGEN SATURATION: 100 %

## 2024-06-27 DIAGNOSIS — Z98.890 OTHER SPECIFIED POSTPROCEDURAL STATES: Chronic | ICD-10-CM

## 2024-06-27 DIAGNOSIS — Z01.810 ENCOUNTER FOR PREPROCEDURAL CARDIOVASCULAR EXAMINATION: ICD-10-CM

## 2024-06-27 DIAGNOSIS — S62.90XA UNSPECIFIED FRACTURE OF UNSPECIFIED WRIST AND HAND, INITIAL ENCOUNTER FOR CLOSED FRACTURE: Chronic | ICD-10-CM

## 2024-06-27 DIAGNOSIS — K92.2 GASTROINTESTINAL HEMORRHAGE, UNSPECIFIED: ICD-10-CM

## 2024-06-27 LAB
ALBUMIN SERPL ELPH-MCNC: 4 G/DL — SIGNIFICANT CHANGE UP (ref 3.3–5.2)
ALP SERPL-CCNC: 44 U/L — SIGNIFICANT CHANGE UP (ref 40–120)
ALT FLD-CCNC: 24 U/L — SIGNIFICANT CHANGE UP
ANION GAP SERPL CALC-SCNC: 11 MMOL/L — SIGNIFICANT CHANGE UP (ref 5–17)
APPEARANCE UR: CLEAR — SIGNIFICANT CHANGE UP
APTT BLD: 26.2 SEC — SIGNIFICANT CHANGE UP (ref 24.5–35.6)
AST SERPL-CCNC: 29 U/L — SIGNIFICANT CHANGE UP
BASOPHILS # BLD AUTO: 0.05 K/UL — SIGNIFICANT CHANGE UP (ref 0–0.2)
BASOPHILS NFR BLD AUTO: 0.8 % — SIGNIFICANT CHANGE UP (ref 0–2)
BILIRUB SERPL-MCNC: 0.5 MG/DL — SIGNIFICANT CHANGE UP (ref 0.4–2)
BILIRUB UR-MCNC: NEGATIVE — SIGNIFICANT CHANGE UP
BLD GP AB SCN SERPL QL: SIGNIFICANT CHANGE UP
BUN SERPL-MCNC: 25.3 MG/DL — HIGH (ref 8–20)
CALCIUM SERPL-MCNC: 9.4 MG/DL — SIGNIFICANT CHANGE UP (ref 8.4–10.5)
CHLORIDE SERPL-SCNC: 104 MMOL/L — SIGNIFICANT CHANGE UP (ref 96–108)
CO2 SERPL-SCNC: 24 MMOL/L — SIGNIFICANT CHANGE UP (ref 22–29)
COLOR SPEC: YELLOW — SIGNIFICANT CHANGE UP
CREAT SERPL-MCNC: 1.08 MG/DL — SIGNIFICANT CHANGE UP (ref 0.5–1.3)
DIFF PNL FLD: NEGATIVE — SIGNIFICANT CHANGE UP
EGFR: 76 ML/MIN/1.73M2 — SIGNIFICANT CHANGE UP
EOSINOPHIL # BLD AUTO: 0.31 K/UL — SIGNIFICANT CHANGE UP (ref 0–0.5)
EOSINOPHIL NFR BLD AUTO: 5.1 % — SIGNIFICANT CHANGE UP (ref 0–6)
GLUCOSE SERPL-MCNC: 101 MG/DL — HIGH (ref 70–99)
GLUCOSE UR QL: NEGATIVE MG/DL — SIGNIFICANT CHANGE UP
HCT VFR BLD CALC: 29.8 % — LOW (ref 39–50)
HCT VFR BLD CALC: 31.9 % — LOW (ref 39–50)
HGB BLD-MCNC: 10.6 G/DL — LOW (ref 13–17)
HGB BLD-MCNC: 11.3 G/DL — LOW (ref 13–17)
IMM GRANULOCYTES NFR BLD AUTO: 0.5 % — SIGNIFICANT CHANGE UP (ref 0–0.9)
INR BLD: 1.04 RATIO — SIGNIFICANT CHANGE UP (ref 0.85–1.18)
KETONES UR-MCNC: NEGATIVE MG/DL — SIGNIFICANT CHANGE UP
LACTATE BLDV-MCNC: 1.3 MMOL/L — SIGNIFICANT CHANGE UP (ref 0.5–2)
LEUKOCYTE ESTERASE UR-ACNC: NEGATIVE — SIGNIFICANT CHANGE UP
LIDOCAIN IGE QN: 12 U/L — LOW (ref 22–51)
LYMPHOCYTES # BLD AUTO: 2.08 K/UL — SIGNIFICANT CHANGE UP (ref 1–3.3)
LYMPHOCYTES # BLD AUTO: 34 % — SIGNIFICANT CHANGE UP (ref 13–44)
MCHC RBC-ENTMCNC: 29.8 PG — SIGNIFICANT CHANGE UP (ref 27–34)
MCHC RBC-ENTMCNC: 29.9 PG — SIGNIFICANT CHANGE UP (ref 27–34)
MCHC RBC-ENTMCNC: 35.4 GM/DL — SIGNIFICANT CHANGE UP (ref 32–36)
MCHC RBC-ENTMCNC: 35.6 GM/DL — SIGNIFICANT CHANGE UP (ref 32–36)
MCV RBC AUTO: 84.2 FL — SIGNIFICANT CHANGE UP (ref 80–100)
MCV RBC AUTO: 84.2 FL — SIGNIFICANT CHANGE UP (ref 80–100)
MONOCYTES # BLD AUTO: 0.48 K/UL — SIGNIFICANT CHANGE UP (ref 0–0.9)
MONOCYTES NFR BLD AUTO: 7.9 % — SIGNIFICANT CHANGE UP (ref 2–14)
NEUTROPHILS # BLD AUTO: 3.16 K/UL — SIGNIFICANT CHANGE UP (ref 1.8–7.4)
NEUTROPHILS NFR BLD AUTO: 51.7 % — SIGNIFICANT CHANGE UP (ref 43–77)
NITRITE UR-MCNC: NEGATIVE — SIGNIFICANT CHANGE UP
PH UR: 5.5 — SIGNIFICANT CHANGE UP (ref 5–8)
PLATELET # BLD AUTO: 137 K/UL — LOW (ref 150–400)
PLATELET # BLD AUTO: 167 K/UL — SIGNIFICANT CHANGE UP (ref 150–400)
POTASSIUM SERPL-MCNC: 3.7 MMOL/L — SIGNIFICANT CHANGE UP (ref 3.5–5.3)
POTASSIUM SERPL-SCNC: 3.7 MMOL/L — SIGNIFICANT CHANGE UP (ref 3.5–5.3)
PROT SERPL-MCNC: 6.3 G/DL — LOW (ref 6.6–8.7)
PROT UR-MCNC: NEGATIVE MG/DL — SIGNIFICANT CHANGE UP
PROTHROM AB SERPL-ACNC: 11.5 SEC — SIGNIFICANT CHANGE UP (ref 9.5–13)
RBC # BLD: 3.54 M/UL — LOW (ref 4.2–5.8)
RBC # BLD: 3.79 M/UL — LOW (ref 4.2–5.8)
RBC # FLD: 13 % — SIGNIFICANT CHANGE UP (ref 10.3–14.5)
RBC # FLD: 13.1 % — SIGNIFICANT CHANGE UP (ref 10.3–14.5)
SODIUM SERPL-SCNC: 139 MMOL/L — SIGNIFICANT CHANGE UP (ref 135–145)
SP GR SPEC: 1.02 — SIGNIFICANT CHANGE UP (ref 1–1.03)
UROBILINOGEN FLD QL: 0.2 MG/DL — SIGNIFICANT CHANGE UP (ref 0.2–1)
WBC # BLD: 5.33 K/UL — SIGNIFICANT CHANGE UP (ref 3.8–10.5)
WBC # BLD: 6.11 K/UL — SIGNIFICANT CHANGE UP (ref 3.8–10.5)
WBC # FLD AUTO: 5.33 K/UL — SIGNIFICANT CHANGE UP (ref 3.8–10.5)
WBC # FLD AUTO: 6.11 K/UL — SIGNIFICANT CHANGE UP (ref 3.8–10.5)

## 2024-06-27 PROCEDURE — 99223 1ST HOSP IP/OBS HIGH 75: CPT

## 2024-06-27 PROCEDURE — 93306 TTE W/DOPPLER COMPLETE: CPT | Mod: 26

## 2024-06-27 PROCEDURE — 74178 CT ABD&PLV WO CNTR FLWD CNTR: CPT | Mod: 26,MC

## 2024-06-27 PROCEDURE — 99285 EMERGENCY DEPT VISIT HI MDM: CPT

## 2024-06-27 PROCEDURE — 99238 HOSP IP/OBS DSCHRG MGMT 30/<: CPT

## 2024-06-27 RX ORDER — LOSARTAN POTASSIUM 100 MG/1
100 TABLET, FILM COATED ORAL ONCE
Refills: 0 | Status: COMPLETED | OUTPATIENT
Start: 2024-06-27 | End: 2024-06-27

## 2024-06-27 RX ORDER — ACETAMINOPHEN 325 MG
1000 TABLET ORAL ONCE
Refills: 0 | Status: COMPLETED | OUTPATIENT
Start: 2024-06-27 | End: 2024-06-27

## 2024-06-27 RX ORDER — SODIUM CHLORIDE 0.9 % (FLUSH) 0.9 %
1000 SYRINGE (ML) INJECTION ONCE
Refills: 0 | Status: COMPLETED | OUTPATIENT
Start: 2024-06-27 | End: 2024-06-27

## 2024-06-27 RX ORDER — MAGNESIUM, ALUMINUM HYDROXIDE 400-400
30 TABLET,CHEWABLE ORAL EVERY 4 HOURS
Refills: 0 | Status: DISCONTINUED | OUTPATIENT
Start: 2024-06-27 | End: 2024-06-29

## 2024-06-27 RX ORDER — ONDANSETRON HYDROCHLORIDE 2 MG/ML
4 INJECTION INTRAMUSCULAR; INTRAVENOUS EVERY 8 HOURS
Refills: 0 | Status: DISCONTINUED | OUTPATIENT
Start: 2024-06-27 | End: 2024-06-29

## 2024-06-27 RX ORDER — ACETAMINOPHEN 325 MG
650 TABLET ORAL EVERY 6 HOURS
Refills: 0 | Status: DISCONTINUED | OUTPATIENT
Start: 2024-06-27 | End: 2024-06-29

## 2024-06-27 RX ORDER — BACITRACIN 500 UNIT/G
1 OINTMENT (GRAM) TOPICAL DAILY
Refills: 0 | Status: DISCONTINUED | OUTPATIENT
Start: 2024-06-27 | End: 2024-06-29

## 2024-06-27 RX ORDER — LOSARTAN/HYDROCHLOROTHIAZIDE 100MG-25MG
1 TABLET ORAL
Refills: 0 | DISCHARGE

## 2024-06-27 RX ORDER — PEG3350/SOD SULF,BICARB,CL/KCL 240-22.72G
4000 SOLUTION, RECONSTITUTED, ORAL ORAL ONCE
Refills: 0 | Status: COMPLETED | OUTPATIENT
Start: 2024-06-27 | End: 2024-06-27

## 2024-06-27 RX ADMIN — Medication 4000 MILLILITER(S): at 17:59

## 2024-06-27 RX ADMIN — Medication 1000 MILLILITER(S): at 05:41

## 2024-06-27 RX ADMIN — LOSARTAN POTASSIUM 100 MILLIGRAM(S): 100 TABLET, FILM COATED ORAL at 14:54

## 2024-06-27 RX ADMIN — Medication 1000 MILLIGRAM(S): at 06:40

## 2024-06-27 RX ADMIN — Medication 400 MILLIGRAM(S): at 05:41

## 2024-06-28 ENCOUNTER — TRANSCRIPTION ENCOUNTER (OUTPATIENT)
Age: 64
End: 2024-06-28

## 2024-06-28 LAB
A1C WITH ESTIMATED AVERAGE GLUCOSE RESULT: 5.4 % — SIGNIFICANT CHANGE UP (ref 4–5.6)
ALBUMIN SERPL ELPH-MCNC: 3.9 G/DL — SIGNIFICANT CHANGE UP (ref 3.3–5.2)
ALP SERPL-CCNC: 40 U/L — SIGNIFICANT CHANGE UP (ref 40–120)
ALT FLD-CCNC: 21 U/L — SIGNIFICANT CHANGE UP
ANION GAP SERPL CALC-SCNC: 11 MMOL/L — SIGNIFICANT CHANGE UP (ref 5–17)
ANION GAP SERPL CALC-SCNC: 13 MMOL/L — SIGNIFICANT CHANGE UP (ref 5–17)
AST SERPL-CCNC: 23 U/L — SIGNIFICANT CHANGE UP
BASOPHILS # BLD AUTO: 0.04 K/UL — SIGNIFICANT CHANGE UP (ref 0–0.2)
BASOPHILS NFR BLD AUTO: 0.9 % — SIGNIFICANT CHANGE UP (ref 0–2)
BILIRUB SERPL-MCNC: 0.7 MG/DL — SIGNIFICANT CHANGE UP (ref 0.4–2)
BUN SERPL-MCNC: 11.9 MG/DL — SIGNIFICANT CHANGE UP (ref 8–20)
BUN SERPL-MCNC: 13.5 MG/DL — SIGNIFICANT CHANGE UP (ref 8–20)
CALCIUM SERPL-MCNC: 9.4 MG/DL — SIGNIFICANT CHANGE UP (ref 8.4–10.5)
CALCIUM SERPL-MCNC: 9.6 MG/DL — SIGNIFICANT CHANGE UP (ref 8.4–10.5)
CHLORIDE SERPL-SCNC: 104 MMOL/L — SIGNIFICANT CHANGE UP (ref 96–108)
CHLORIDE SERPL-SCNC: 104 MMOL/L — SIGNIFICANT CHANGE UP (ref 96–108)
CHOLEST SERPL-MCNC: 165 MG/DL — SIGNIFICANT CHANGE UP
CO2 SERPL-SCNC: 22 MMOL/L — SIGNIFICANT CHANGE UP (ref 22–29)
CO2 SERPL-SCNC: 24 MMOL/L — SIGNIFICANT CHANGE UP (ref 22–29)
CREAT SERPL-MCNC: 0.91 MG/DL — SIGNIFICANT CHANGE UP (ref 0.5–1.3)
CREAT SERPL-MCNC: 0.97 MG/DL — SIGNIFICANT CHANGE UP (ref 0.5–1.3)
CULTURE RESULTS: SIGNIFICANT CHANGE UP
EGFR: 86 ML/MIN/1.73M2 — SIGNIFICANT CHANGE UP
EGFR: 93 ML/MIN/1.73M2 — SIGNIFICANT CHANGE UP
EOSINOPHIL # BLD AUTO: 0.21 K/UL — SIGNIFICANT CHANGE UP (ref 0–0.5)
EOSINOPHIL NFR BLD AUTO: 4.6 % — SIGNIFICANT CHANGE UP (ref 0–6)
ESTIMATED AVERAGE GLUCOSE: 108 MG/DL — SIGNIFICANT CHANGE UP (ref 68–114)
GLUCOSE SERPL-MCNC: 85 MG/DL — SIGNIFICANT CHANGE UP (ref 70–99)
GLUCOSE SERPL-MCNC: 87 MG/DL — SIGNIFICANT CHANGE UP (ref 70–99)
HCT VFR BLD CALC: 30.2 % — LOW (ref 39–50)
HDLC SERPL-MCNC: 34 MG/DL — LOW
HGB BLD-MCNC: 10.6 G/DL — LOW (ref 13–17)
IMM GRANULOCYTES NFR BLD AUTO: 0.4 % — SIGNIFICANT CHANGE UP (ref 0–0.9)
INR BLD: 1.05 RATIO — SIGNIFICANT CHANGE UP (ref 0.85–1.18)
LIPID PNL WITH DIRECT LDL SERPL: 105 MG/DL — HIGH
LYMPHOCYTES # BLD AUTO: 1.45 K/UL — SIGNIFICANT CHANGE UP (ref 1–3.3)
LYMPHOCYTES # BLD AUTO: 31.6 % — SIGNIFICANT CHANGE UP (ref 13–44)
MCHC RBC-ENTMCNC: 29.9 PG — SIGNIFICANT CHANGE UP (ref 27–34)
MCHC RBC-ENTMCNC: 35.1 GM/DL — SIGNIFICANT CHANGE UP (ref 32–36)
MCV RBC AUTO: 85.1 FL — SIGNIFICANT CHANGE UP (ref 80–100)
MONOCYTES # BLD AUTO: 0.38 K/UL — SIGNIFICANT CHANGE UP (ref 0–0.9)
MONOCYTES NFR BLD AUTO: 8.3 % — SIGNIFICANT CHANGE UP (ref 2–14)
NEUTROPHILS # BLD AUTO: 2.49 K/UL — SIGNIFICANT CHANGE UP (ref 1.8–7.4)
NEUTROPHILS NFR BLD AUTO: 54.2 % — SIGNIFICANT CHANGE UP (ref 43–77)
NON HDL CHOLESTEROL: 131 MG/DL — HIGH
PLATELET # BLD AUTO: 151 K/UL — SIGNIFICANT CHANGE UP (ref 150–400)
POTASSIUM SERPL-MCNC: 3.3 MMOL/L — LOW (ref 3.5–5.3)
POTASSIUM SERPL-MCNC: 3.9 MMOL/L — SIGNIFICANT CHANGE UP (ref 3.5–5.3)
POTASSIUM SERPL-SCNC: 3.3 MMOL/L — LOW (ref 3.5–5.3)
POTASSIUM SERPL-SCNC: 3.9 MMOL/L — SIGNIFICANT CHANGE UP (ref 3.5–5.3)
PROT SERPL-MCNC: 6.3 G/DL — LOW (ref 6.6–8.7)
PROTHROM AB SERPL-ACNC: 11.6 SEC — SIGNIFICANT CHANGE UP (ref 9.5–13)
RBC # BLD: 3.55 M/UL — LOW (ref 4.2–5.8)
RBC # FLD: 13 % — SIGNIFICANT CHANGE UP (ref 10.3–14.5)
SODIUM SERPL-SCNC: 139 MMOL/L — SIGNIFICANT CHANGE UP (ref 135–145)
SODIUM SERPL-SCNC: 139 MMOL/L — SIGNIFICANT CHANGE UP (ref 135–145)
SPECIMEN SOURCE: SIGNIFICANT CHANGE UP
TRIGL SERPL-MCNC: 131 MG/DL — SIGNIFICANT CHANGE UP
WBC # BLD: 4.59 K/UL — SIGNIFICANT CHANGE UP (ref 3.8–10.5)
WBC # FLD AUTO: 4.59 K/UL — SIGNIFICANT CHANGE UP (ref 3.8–10.5)

## 2024-06-28 PROCEDURE — 88305 TISSUE EXAM BY PATHOLOGIST: CPT | Mod: 26

## 2024-06-28 PROCEDURE — 45385 COLONOSCOPY W/LESION REMOVAL: CPT

## 2024-06-28 PROCEDURE — 99232 SBSQ HOSP IP/OBS MODERATE 35: CPT

## 2024-06-28 DEVICE — NAIL OSTEO 1.5X16MM STRL: Type: IMPLANTABLE DEVICE | Status: FUNCTIONAL

## 2024-06-28 RX ORDER — POTASSIUM CHLORIDE 600 MG/1
10 TABLET, FILM COATED, EXTENDED RELEASE ORAL ONCE
Refills: 0 | Status: COMPLETED | OUTPATIENT
Start: 2024-06-28 | End: 2024-06-28

## 2024-06-28 RX ADMIN — POTASSIUM CHLORIDE 100 MILLIEQUIVALENT(S): 600 TABLET, FILM COATED, EXTENDED RELEASE ORAL at 10:30

## 2024-06-28 RX ADMIN — Medication 1 APPLICATION(S): at 13:11

## 2024-06-29 ENCOUNTER — TRANSCRIPTION ENCOUNTER (OUTPATIENT)
Age: 64
End: 2024-06-29

## 2024-06-29 VITALS
HEART RATE: 95 BPM | SYSTOLIC BLOOD PRESSURE: 128 MMHG | RESPIRATION RATE: 18 BRPM | DIASTOLIC BLOOD PRESSURE: 64 MMHG | TEMPERATURE: 98 F | OXYGEN SATURATION: 95 %

## 2024-06-29 LAB
ANION GAP SERPL CALC-SCNC: 10 MMOL/L — SIGNIFICANT CHANGE UP (ref 5–17)
BUN SERPL-MCNC: 17.4 MG/DL — SIGNIFICANT CHANGE UP (ref 8–20)
CALCIUM SERPL-MCNC: 9.5 MG/DL — SIGNIFICANT CHANGE UP (ref 8.4–10.5)
CHLORIDE SERPL-SCNC: 106 MMOL/L — SIGNIFICANT CHANGE UP (ref 96–108)
CO2 SERPL-SCNC: 24 MMOL/L — SIGNIFICANT CHANGE UP (ref 22–29)
CREAT SERPL-MCNC: 1.18 MG/DL — SIGNIFICANT CHANGE UP (ref 0.5–1.3)
EGFR: 68 ML/MIN/1.73M2 — SIGNIFICANT CHANGE UP
GLUCOSE SERPL-MCNC: 103 MG/DL — HIGH (ref 70–99)
HCT VFR BLD CALC: 28.9 % — LOW (ref 39–50)
HGB BLD-MCNC: 10 G/DL — LOW (ref 13–17)
MAGNESIUM SERPL-MCNC: 2.1 MG/DL — SIGNIFICANT CHANGE UP (ref 1.6–2.6)
MCHC RBC-ENTMCNC: 29.8 PG — SIGNIFICANT CHANGE UP (ref 27–34)
MCHC RBC-ENTMCNC: 34.6 GM/DL — SIGNIFICANT CHANGE UP (ref 32–36)
MCV RBC AUTO: 86 FL — SIGNIFICANT CHANGE UP (ref 80–100)
PHOSPHATE SERPL-MCNC: 3.6 MG/DL — SIGNIFICANT CHANGE UP (ref 2.4–4.7)
PLATELET # BLD AUTO: 149 K/UL — LOW (ref 150–400)
POTASSIUM SERPL-MCNC: 3.9 MMOL/L — SIGNIFICANT CHANGE UP (ref 3.5–5.3)
POTASSIUM SERPL-SCNC: 3.9 MMOL/L — SIGNIFICANT CHANGE UP (ref 3.5–5.3)
RBC # BLD: 3.36 M/UL — LOW (ref 4.2–5.8)
RBC # FLD: 13.1 % — SIGNIFICANT CHANGE UP (ref 10.3–14.5)
SODIUM SERPL-SCNC: 140 MMOL/L — SIGNIFICANT CHANGE UP (ref 135–145)
WBC # BLD: 5.22 K/UL — SIGNIFICANT CHANGE UP (ref 3.8–10.5)
WBC # FLD AUTO: 5.22 K/UL — SIGNIFICANT CHANGE UP (ref 3.8–10.5)

## 2024-06-29 PROCEDURE — 83605 ASSAY OF LACTIC ACID: CPT

## 2024-06-29 PROCEDURE — 86850 RBC ANTIBODY SCREEN: CPT

## 2024-06-29 PROCEDURE — 81003 URINALYSIS AUTO W/O SCOPE: CPT

## 2024-06-29 PROCEDURE — 93005 ELECTROCARDIOGRAM TRACING: CPT

## 2024-06-29 PROCEDURE — 87086 URINE CULTURE/COLONY COUNT: CPT

## 2024-06-29 PROCEDURE — 80048 BASIC METABOLIC PNL TOTAL CA: CPT

## 2024-06-29 PROCEDURE — 85025 COMPLETE CBC W/AUTO DIFF WBC: CPT

## 2024-06-29 PROCEDURE — 83036 HEMOGLOBIN GLYCOSYLATED A1C: CPT

## 2024-06-29 PROCEDURE — 36415 COLL VENOUS BLD VENIPUNCTURE: CPT

## 2024-06-29 PROCEDURE — 85610 PROTHROMBIN TIME: CPT

## 2024-06-29 PROCEDURE — 86900 BLOOD TYPING SEROLOGIC ABO: CPT

## 2024-06-29 PROCEDURE — 99285 EMERGENCY DEPT VISIT HI MDM: CPT | Mod: 25

## 2024-06-29 PROCEDURE — 80053 COMPREHEN METABOLIC PANEL: CPT

## 2024-06-29 PROCEDURE — 83690 ASSAY OF LIPASE: CPT

## 2024-06-29 PROCEDURE — 86901 BLOOD TYPING SEROLOGIC RH(D): CPT

## 2024-06-29 PROCEDURE — 93306 TTE W/DOPPLER COMPLETE: CPT

## 2024-06-29 PROCEDURE — 96374 THER/PROPH/DIAG INJ IV PUSH: CPT

## 2024-06-29 PROCEDURE — 85027 COMPLETE CBC AUTOMATED: CPT

## 2024-06-29 PROCEDURE — 84100 ASSAY OF PHOSPHORUS: CPT

## 2024-06-29 PROCEDURE — 74178 CT ABD&PLV WO CNTR FLWD CNTR: CPT | Mod: MC

## 2024-06-29 PROCEDURE — 85730 THROMBOPLASTIN TIME PARTIAL: CPT

## 2024-06-29 PROCEDURE — 80061 LIPID PANEL: CPT

## 2024-06-29 PROCEDURE — 83735 ASSAY OF MAGNESIUM: CPT

## 2024-06-29 PROCEDURE — 88305 TISSUE EXAM BY PATHOLOGIST: CPT

## 2024-06-29 PROCEDURE — 99239 HOSP IP/OBS DSCHRG MGMT >30: CPT

## 2024-06-29 RX ORDER — AMLODIPINE BESYLATE 2.5 MG/1
1 TABLET ORAL
Qty: 30 | Refills: 0
Start: 2024-06-29 | End: 2024-07-28

## 2024-06-29 RX ORDER — LOSARTAN/HYDROCHLOROTHIAZIDE 100MG-25MG
1 TABLET ORAL
Qty: 30 | Refills: 0
Start: 2024-06-29 | End: 2024-07-28

## 2024-06-29 RX ORDER — LOSARTAN/HYDROCHLOROTHIAZIDE 100MG-25MG
1 TABLET ORAL
Refills: 0
Start: 2024-06-29

## 2024-07-03 LAB — SURGICAL PATHOLOGY STUDY: SIGNIFICANT CHANGE UP

## 2024-10-09 ENCOUNTER — APPOINTMENT (OUTPATIENT)
Dept: ELECTROPHYSIOLOGY | Facility: CLINIC | Age: 64
End: 2024-10-09

## (undated) DEVICE — TUBING ALARIS PUMP MODULE NON-DEHP

## (undated) DEVICE — SNARE CAPTIVATOR COLD RND STIFF 10X2.4X2.8MM 240CM

## (undated) DEVICE — SENSOR O2 FINGER ADULT

## (undated) DEVICE — TUBING IV EXTENSION MACRO W CLAVE 7"

## (undated) DEVICE — SOL IRR BAG H2O 1000ML

## (undated) DEVICE — PACK IV START WITH CHG

## (undated) DEVICE — MASK PROCED EARLOOP 50/BX LRC COVID ADD

## (undated) DEVICE — SYR IV FLUSH SALINE 10ML 30/TY

## (undated) DEVICE — FORCEP RADIAL JAW 4 W NDL 2.4MM 2.8MM 240CM ORANGE DISP

## (undated) DEVICE — SOL BAG NS 0.9% 1000ML

## (undated) DEVICE — DRSG 2X2

## (undated) DEVICE — SYR SLIP 10CC

## (undated) DEVICE — POLY TRAP ETRAP

## (undated) DEVICE — DRSG CURITY GAUZE SPONGE 4 X 4" 12-PLY NON-STERILE

## (undated) DEVICE — GOWN IMPERV XL

## (undated) DEVICE — KIT DEFENDO 4 OLY 4 PC

## (undated) DEVICE — SYR LUER SLIP TIP 50CC

## (undated) DEVICE — UNDERPAD LINEN SAVER 23 X 36"

## (undated) DEVICE — CATH IV SAFE BC 22G X 1" (BLUE)

## (undated) DEVICE — DENTURE CUP PINK